# Patient Record
Sex: FEMALE | Race: OTHER | NOT HISPANIC OR LATINO | Employment: STUDENT | URBAN - METROPOLITAN AREA
[De-identification: names, ages, dates, MRNs, and addresses within clinical notes are randomized per-mention and may not be internally consistent; named-entity substitution may affect disease eponyms.]

---

## 2018-01-24 ENCOUNTER — PROCEDURE VISIT (OUTPATIENT)
Dept: OBSTETRICS AND GYNECOLOGY | Facility: CLINIC | Age: 33
End: 2018-01-24

## 2018-01-24 ENCOUNTER — OFFICE VISIT (OUTPATIENT)
Dept: OBSTETRICS AND GYNECOLOGY | Facility: CLINIC | Age: 33
End: 2018-01-24

## 2018-01-24 VITALS
WEIGHT: 163 LBS | BODY MASS INDEX: 27.16 KG/M2 | HEIGHT: 65 IN | DIASTOLIC BLOOD PRESSURE: 80 MMHG | SYSTOLIC BLOOD PRESSURE: 110 MMHG

## 2018-01-24 DIAGNOSIS — E28.2 POLYCYSTIC DISEASE, OVARIES: Primary | ICD-10-CM

## 2018-01-24 DIAGNOSIS — E28.2 POLYCYSTIC OVARIES: Primary | ICD-10-CM

## 2018-01-24 PROCEDURE — 99213 OFFICE O/P EST LOW 20 MIN: CPT | Performed by: OBSTETRICS & GYNECOLOGY

## 2018-01-24 PROCEDURE — 76830 TRANSVAGINAL US NON-OB: CPT | Performed by: OBSTETRICS & GYNECOLOGY

## 2018-01-24 NOTE — PROGRESS NOTES
Subjective:    Patient Elza Arcos is a 32 y.o. female.   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      Review of Systems      Objective:      Physical Exam   Constitutional: She appears well-developed and well-nourished. She is not intubated.   HENT:   Head: Hair is normal.   Nose: Nose normal.   Mouth/Throat: Oropharynx is clear and moist.   Eyes: Conjunctivae are normal.   Neck: Normal carotid pulses and no JVD present. No tracheal tenderness, no spinous process tenderness and no muscular tenderness present. Carotid bruit is not present. No rigidity. No edema, no erythema and normal range of motion present. No thyroid mass and no thyromegaly present.   Cardiovascular: Normal rate, regular rhythm, S1 normal and normal heart sounds.  Exam reveals no gallop.    No murmur heard.  Pulmonary/Chest: Effort normal. No accessory muscle usage or stridor. No apnea, no tachypnea and no bradypnea. She is not intubated. No respiratory distress. She has no wheezes. She has no rales. She exhibits no tenderness. Right breast exhibits no inverted nipple, no mass, no nipple discharge, no skin change and no tenderness. Left breast exhibits no inverted nipple, no mass, no nipple discharge, no skin change and no tenderness.   Abdominal: Soft. Bowel sounds are normal. She exhibits no distension and no mass. There is no tenderness. There is no rebound and no guarding. No hernia.   Genitourinary: Vagina normal and uterus normal. Rectal exam shows no external  hemorrhoid, no internal hemorrhoid, no fissure, no mass, no tenderness and anal tone normal. There is no rash, tenderness, lesion or injury on the right labia. There is no rash, tenderness, lesion or injury on the left labia. Uterus is not deviated, not enlarged, not fixed and not tender. Cervix exhibits no motion tenderness, no discharge and no friability. Right adnexum displays no mass and no tenderness. Left adnexum displays no mass and no tenderness. No erythema, tenderness or bleeding in the vagina. No foreign body in the vagina. No signs of injury around the vagina. No vaginal discharge found.   Musculoskeletal: She exhibits no edema or tenderness.        Right shoulder: She exhibits no tenderness, no swelling, no pain and no spasm.   Lymphadenopathy:        Head (right side): No submental, no submandibular, no tonsillar, no preauricular, no posterior auricular and no occipital adenopathy present.        Head (left side): No submental, no submandibular, no tonsillar, no preauricular, no posterior auricular and no occipital adenopathy present.     She has no cervical adenopathy.        Right cervical: No superficial cervical, no deep cervical and no posterior cervical adenopathy present.       Left cervical: No superficial cervical, no deep cervical and no posterior cervical adenopathy present.        Right axillary: No pectoral and no lateral adenopathy present.        Left axillary: No pectoral and no lateral adenopathy present.       Right: No inguinal, no supraclavicular and no epitrochlear adenopathy present.        Left: No inguinal, no supraclavicular and no epitrochlear adenopathy present.   Neurological: No cranial nerve deficit. Coordination normal.   Skin: Skin is warm and dry. No abrasion, no bruising, no burn, no lesion, no petechiae, no purpura and no rash noted. Rash is not macular, not maculopapular, not nodular and not urticarial. No cyanosis or erythema. No pallor. Nails show no clubbing.    Psychiatric: She has a normal mood and affect. Her behavior is normal.         Assessment and Plan: Patient is has a history of polycystic ovaries options are discussed in detail with one option being taking the birth control pills for 3 months and see if that helps his far as the cycles clomiphene is discussed risks are discussed as far as twinning especially referral to infertility discussed    Patient has been instructed to perform a self breast exam on a weekly basis, a yearly mammogram, pap smear yearly unless instructed otherwise and bone density every 2 years.  I recommended that the patient not smoke, and discussed smoking cessation when appropriate.     There are no diagnoses linked to this encounter.

## 2018-03-07 ENCOUNTER — TELEPHONE (OUTPATIENT)
Dept: OBSTETRICS AND GYNECOLOGY | Facility: CLINIC | Age: 33
End: 2018-03-07

## 2018-03-14 ENCOUNTER — OFFICE VISIT (OUTPATIENT)
Dept: OBSTETRICS AND GYNECOLOGY | Facility: CLINIC | Age: 33
End: 2018-03-14

## 2018-03-14 VITALS
DIASTOLIC BLOOD PRESSURE: 80 MMHG | SYSTOLIC BLOOD PRESSURE: 110 MMHG | WEIGHT: 163 LBS | BODY MASS INDEX: 27.16 KG/M2 | HEIGHT: 65 IN

## 2018-03-14 DIAGNOSIS — N83.202 CYST OF LEFT OVARY: Primary | ICD-10-CM

## 2018-03-14 DIAGNOSIS — Z78.9 USES BIRTH CONTROL: ICD-10-CM

## 2018-03-14 LAB
B-HCG UR QL: NEGATIVE
INTERNAL NEGATIVE CONTROL: NEGATIVE
INTERNAL POSITIVE CONTROL: POSITIVE
Lab: NORMAL

## 2018-03-14 PROCEDURE — 99213 OFFICE O/P EST LOW 20 MIN: CPT | Performed by: OBSTETRICS & GYNECOLOGY

## 2018-03-14 PROCEDURE — 81025 URINE PREGNANCY TEST: CPT | Performed by: OBSTETRICS & GYNECOLOGY

## 2018-03-14 RX ORDER — ETHYNODIOL DIACETATE AND ETHINYL ESTRADIOL 1 MG-35MCG
1 KIT ORAL DAILY
COMMUNITY
End: 2018-12-17

## 2018-03-14 NOTE — PROGRESS NOTES
Subjective:    Patient Elza Arcos is a 32 y.o. female.   Chief Complaint   Patient presents with   • Medication Problem     Taking Kelnor pt states has been having pain in breast, cramping, nausea, vomiting     CCPatient presents with the significant breast pain cramping nausea vomiting a shouldn't is taking the birth control pill feels like she did ovulate maybe this month so feel like the patient is producing a large amount of hormones from the ovary and also taking the pill    HPI      The following portions of the patient's history were reviewed and updated as appropriate: allergies, current medications, past family history, past medical history, past social history, past surgical history and problem list.      Review of Systems   Constitutional: Negative.    HENT: Negative.    Eyes: Negative.    Respiratory: Negative.    Cardiovascular: Negative.    Gastrointestinal: Negative for abdominal distention, abdominal pain, anal bleeding, blood in stool, constipation, diarrhea, nausea, rectal pain and vomiting.   Endocrine: Negative for cold intolerance, heat intolerance, polydipsia, polyphagia and polyuria.   Genitourinary: Negative.  Negative for decreased urine volume, dyspareunia, dysuria, enuresis, flank pain, frequency, genital sores, hematuria, menstrual problem, pelvic pain, urgency, vaginal bleeding, vaginal discharge and vaginal pain.   Musculoskeletal: Negative.    Skin: Negative.    Allergic/Immunologic: Negative.    Neurological: Negative.    Hematological: Negative for adenopathy. Does not bruise/bleed easily.   Psychiatric/Behavioral: Negative for agitation, confusion and sleep disturbance. The patient is not nervous/anxious.          Objective:      Physical Exam   Constitutional: She appears well-developed and well-nourished. She is not intubated.   HENT:   Head: Hair is normal.   Nose: Nose normal.   Mouth/Throat: Oropharynx is clear and moist.   Eyes: Conjunctivae are normal.   Neck: Normal  carotid pulses and no JVD present. No tracheal tenderness, no spinous process tenderness and no muscular tenderness present. Carotid bruit is not present. No no neck rigidity. No edema, no erythema and normal range of motion present. No thyroid mass and no thyromegaly present.   Cardiovascular: Normal rate, regular rhythm, S1 normal and normal heart sounds.  Exam reveals no gallop.    No murmur heard.  Pulmonary/Chest: Effort normal. No accessory muscle usage or stridor. No apnea, no tachypnea and no bradypnea. She is not intubated. No respiratory distress. She has no wheezes. She has no rales. She exhibits no tenderness. Right breast exhibits no inverted nipple, no mass, no nipple discharge, no skin change and no tenderness. Left breast exhibits no inverted nipple, no mass, no nipple discharge, no skin change and no tenderness.   Abdominal: Soft. Bowel sounds are normal. She exhibits no distension and no mass. There is no tenderness. There is no rebound and no guarding. No hernia.   Genitourinary: Vagina normal and uterus normal. Rectal exam shows no external hemorrhoid, no internal hemorrhoid, no fissure, no mass, no tenderness and anal tone normal. There is no rash, tenderness, lesion or injury on the right labia. There is no rash, tenderness, lesion or injury on the left labia. Uterus is not deviated, not enlarged, not fixed and not tender. Cervix exhibits no motion tenderness, no discharge and no friability. Right adnexum displays no mass and no tenderness. Left adnexum displays no mass and no tenderness. No erythema, tenderness or bleeding in the vagina. No foreign body in the vagina. No signs of injury around the vagina. No vaginal discharge found.   Musculoskeletal: She exhibits no edema or tenderness.        Right shoulder: She exhibits no tenderness, no swelling, no pain and no spasm.   Lymphadenopathy:        Head (right side): No submental, no submandibular, no tonsillar, no preauricular, no posterior  auricular and no occipital adenopathy present.        Head (left side): No submental, no submandibular, no tonsillar, no preauricular, no posterior auricular and no occipital adenopathy present.     She has no cervical adenopathy.        Right cervical: No superficial cervical, no deep cervical and no posterior cervical adenopathy present.       Left cervical: No superficial cervical, no deep cervical and no posterior cervical adenopathy present.        Right axillary: No pectoral and no lateral adenopathy present.        Left axillary: No pectoral and no lateral adenopathy present.       Right: No inguinal, no supraclavicular and no epitrochlear adenopathy present.        Left: No inguinal, no supraclavicular and no epitrochlear adenopathy present.   Neurological: No cranial nerve deficit. Coordination normal.   Skin: Skin is warm and dry. No abrasion, no bruising, no burn, no lesion, no petechiae, no purpura and no rash noted. Rash is not macular, not maculopapular, not nodular and not urticarial. No cyanosis or erythema. No pallor. Nails show no clubbing.   Psychiatric: She has a normal mood and affect. Her behavior is normal.         Assessment and Plan: Patient's having significant breast tenderness with breast enlargement obviously from the hormone production patient felt like she ovulated this month and feel like the birth control pills plus ovulation is the reason for the increase hormone production urine pregnancy test was negative so we will have the ultrasound scheduled next Wednesday patient is to stop the birth control pills after the next 2 pills stay off the pills 3 days and restart with suppression patient is to have ultrasound next Wednesday so we will recheck her at that time    Patient has been instructed to perform a self breast exam on a weekly basis, a yearly mammogram, pap smear yearly unless instructed otherwise and bone density every 2 years.  I recommended that the patient not smoke, and  discussed smoking cessation when appropriate.     There are no diagnoses linked to this encounter.

## 2018-04-18 ENCOUNTER — OFFICE VISIT (OUTPATIENT)
Dept: OBSTETRICS AND GYNECOLOGY | Facility: CLINIC | Age: 33
End: 2018-04-18

## 2018-04-18 ENCOUNTER — PROCEDURE VISIT (OUTPATIENT)
Dept: OBSTETRICS AND GYNECOLOGY | Facility: CLINIC | Age: 33
End: 2018-04-18

## 2018-04-18 VITALS
BODY MASS INDEX: 27.16 KG/M2 | WEIGHT: 163 LBS | DIASTOLIC BLOOD PRESSURE: 80 MMHG | SYSTOLIC BLOOD PRESSURE: 110 MMHG | HEIGHT: 65 IN

## 2018-04-18 DIAGNOSIS — N93.9 ABNORMAL UTERINE BLEEDING: Primary | ICD-10-CM

## 2018-04-18 DIAGNOSIS — E28.2 POLYCYSTIC OVARIES: Primary | ICD-10-CM

## 2018-04-18 PROCEDURE — 76830 TRANSVAGINAL US NON-OB: CPT | Performed by: OBSTETRICS & GYNECOLOGY

## 2018-04-18 PROCEDURE — 99213 OFFICE O/P EST LOW 20 MIN: CPT | Performed by: OBSTETRICS & GYNECOLOGY

## 2018-04-18 NOTE — PROGRESS NOTES
Subjective:    Patient Elza Arcos is a 32 y.o. female.   Chief Complaint   Patient presents with   • Follow-up     U/S   This patient has polycystic ovaries presents today after being on the pill for 3 months the ovaries number of systems decreased and the patient is desirous of trying to get pregnant so the plan will be to stop the pills and see if the patient ovulates no ovulation in several months will try the patient on clomiphene for 3 months      HPI  patient desires pregnancy does have multicystic ovaries which patient was placed on suppression for 3 months and the number of cystic activity has decreased so will stop the pill and see if patient ovulates       The following portions of the patient's history were reviewed and updated as appropriate: allergies, current medications, past family history, past medical history, past social history, past surgical history and problem list.      Review of Systems   Constitutional: Negative.    HENT: Negative.    Eyes: Negative.    Respiratory: Negative.    Cardiovascular: Negative.    Gastrointestinal: Negative for abdominal distention, abdominal pain, anal bleeding, blood in stool, constipation, diarrhea, nausea, rectal pain and vomiting.   Endocrine: Negative for cold intolerance, heat intolerance, polydipsia, polyphagia and polyuria.   Genitourinary: Negative.  Negative for decreased urine volume, dyspareunia, dysuria, enuresis, flank pain, frequency, genital sores, hematuria, menstrual problem, pelvic pain, urgency, vaginal bleeding, vaginal discharge and vaginal pain.   Musculoskeletal: Negative.    Skin: Negative.    Allergic/Immunologic: Negative.    Neurological: Negative.    Hematological: Negative for adenopathy. Does not bruise/bleed easily.   Psychiatric/Behavioral: Negative for agitation, confusion and sleep disturbance. The patient is not nervous/anxious.          Objective:      Physical Exam   Constitutional: She appears well-developed and  well-nourished. She is not intubated.   HENT:   Head: Hair is normal.   Nose: Nose normal.   Mouth/Throat: Oropharynx is clear and moist.   Eyes: Conjunctivae are normal.   Neck: Normal carotid pulses and no JVD present. No tracheal tenderness, no spinous process tenderness and no muscular tenderness present. Carotid bruit is not present. No no neck rigidity. No edema, no erythema and normal range of motion present. No thyroid mass and no thyromegaly present.   Cardiovascular: Normal rate, regular rhythm, S1 normal and normal heart sounds.  Exam reveals no gallop.    No murmur heard.  Pulmonary/Chest: Effort normal. No accessory muscle usage or stridor. No apnea, no tachypnea and no bradypnea. She is not intubated. No respiratory distress. She has no wheezes. She has no rales. She exhibits no tenderness. Right breast exhibits no inverted nipple, no mass, no nipple discharge, no skin change and no tenderness. Left breast exhibits no inverted nipple, no mass, no nipple discharge, no skin change and no tenderness.   Abdominal: Soft. Bowel sounds are normal. She exhibits no distension and no mass. There is no tenderness. There is no rebound and no guarding. No hernia.   Genitourinary: Vagina normal and uterus normal. Rectal exam shows no external hemorrhoid, no internal hemorrhoid, no fissure, no mass, no tenderness and anal tone normal. There is no rash, tenderness, lesion or injury on the right labia. There is no rash, tenderness, lesion or injury on the left labia. Uterus is not deviated, not enlarged, not fixed and not tender. Cervix exhibits no motion tenderness, no discharge and no friability. Right adnexum displays no mass and no tenderness. Left adnexum displays no mass and no tenderness. No erythema, tenderness or bleeding in the vagina. No foreign body in the vagina. No signs of injury around the vagina. No vaginal discharge found.   Musculoskeletal: She exhibits no edema or tenderness.        Right shoulder:  She exhibits no tenderness, no swelling, no pain and no spasm.   Lymphadenopathy:        Head (right side): No submental, no submandibular, no tonsillar, no preauricular, no posterior auricular and no occipital adenopathy present.        Head (left side): No submental, no submandibular, no tonsillar, no preauricular, no posterior auricular and no occipital adenopathy present.     She has no cervical adenopathy.        Right cervical: No superficial cervical, no deep cervical and no posterior cervical adenopathy present.       Left cervical: No superficial cervical, no deep cervical and no posterior cervical adenopathy present.        Right axillary: No pectoral and no lateral adenopathy present.        Left axillary: No pectoral and no lateral adenopathy present.       Right: No inguinal, no supraclavicular and no epitrochlear adenopathy present.        Left: No inguinal, no supraclavicular and no epitrochlear adenopathy present.   Neurological: No cranial nerve deficit. Coordination normal.   Skin: Skin is warm and dry. No abrasion, no bruising, no burn, no lesion, no petechiae, no purpura and no rash noted. Rash is not macular, not maculopapular, not nodular and not urticarial. No cyanosis or erythema. No pallor. Nails show no clubbing.   Psychiatric: She has a normal mood and affect. Her behavior is normal.         Assessment and Plan: Ultrasound reviewed in detail with the patient and the cystic activity has decreased and the ovaries so will stop the pills and see if the patient ovulates if not in several months will try the patient on clomiphene for several cycles    Patient has been instructed to perform a self breast exam on a weekly basis, a yearly mammogram, pap smear yearly unless instructed otherwise and bone density every 2 years.  I recommended that the patient not smoke, and discussed smoking cessation when appropriate.     Elza was seen today for follow-up.    Diagnoses and all orders for this  visit:    Polycystic ovaries

## 2018-06-07 ENCOUNTER — TELEPHONE (OUTPATIENT)
Dept: OBSTETRICS AND GYNECOLOGY | Facility: CLINIC | Age: 33
End: 2018-06-07

## 2018-06-07 NOTE — TELEPHONE ENCOUNTER
Escribed Clomid #6 1qd starting on third day of cycle. x3 refills. Per Dr. Gonzales. Wants pt to see him after she gets done with Rx. Informed pt. Pt still had some questions so she is keeping upcoming appt then will make another one after that to see him. SM  ----- Message from June Vidales sent at 6/6/2018  1:04 PM EDT -----  PT CALLED REGARDING A SCRIPT FOR CLOMID.  NO SCRIPT FOR CLOMID OR PHARMACY LISTED IN CHART.  PT'S # [phone]

## 2018-06-11 ENCOUNTER — OFFICE VISIT (OUTPATIENT)
Dept: OBSTETRICS AND GYNECOLOGY | Facility: CLINIC | Age: 33
End: 2018-06-11

## 2018-06-11 VITALS
BODY MASS INDEX: 27.16 KG/M2 | SYSTOLIC BLOOD PRESSURE: 110 MMHG | WEIGHT: 163 LBS | DIASTOLIC BLOOD PRESSURE: 80 MMHG | HEIGHT: 65 IN

## 2018-06-11 DIAGNOSIS — N97.0 ANOVULATION: ICD-10-CM

## 2018-06-11 DIAGNOSIS — Z87.42 HISTORY OF IRREGULAR MENSTRUAL CYCLES: Primary | ICD-10-CM

## 2018-06-11 PROCEDURE — 99213 OFFICE O/P EST LOW 20 MIN: CPT | Performed by: OBSTETRICS & GYNECOLOGY

## 2018-06-12 ENCOUNTER — TELEPHONE (OUTPATIENT)
Dept: OBSTETRICS AND GYNECOLOGY | Facility: CLINIC | Age: 33
End: 2018-06-12

## 2018-06-12 NOTE — PROGRESS NOTES
Subjective:    Patient Elza Arcos is a 33 y.o. female.   Chief Complaint   Patient presents with   • Follow-up     F/U MEDS     CCPatient has a history of irregular cycles desiring pregnancy will start Clomid 50 mg day 3-8 ovulatory time and explained to she and her     HPI patient is normally healthy having no major issues except irregular cycles will start Clomid      The following portions of the patient's history were reviewed and updated as appropriate: allergies, current medications, past family history, past medical history, past social history, past surgical history and problem list.      Review of Systems   Constitutional: Negative.    HENT: Negative.    Eyes: Negative.    Respiratory: Negative.    Cardiovascular: Negative.    Gastrointestinal: Negative for abdominal distention, abdominal pain, anal bleeding, blood in stool, constipation, diarrhea, nausea, rectal pain and vomiting.   Endocrine: Negative for cold intolerance, heat intolerance, polydipsia, polyphagia and polyuria.   Genitourinary: Negative.  Negative for decreased urine volume, dyspareunia, dysuria, enuresis, flank pain, frequency, genital sores, hematuria, menstrual problem, pelvic pain, urgency, vaginal bleeding, vaginal discharge and vaginal pain.   Musculoskeletal: Negative.    Skin: Negative.    Allergic/Immunologic: Negative.    Neurological: Negative.    Hematological: Negative for adenopathy. Does not bruise/bleed easily.   Psychiatric/Behavioral: Negative for agitation, confusion and sleep disturbance. The patient is not nervous/anxious.          Objective:      Physical Exam   Constitutional: She appears well-developed and well-nourished. She is not intubated.   HENT:   Head: Hair is normal.   Nose: Nose normal.   Mouth/Throat: Oropharynx is clear and moist.   Eyes: Conjunctivae are normal.   Neck: Normal carotid pulses and no JVD present. No tracheal tenderness, no spinous process tenderness and no muscular tenderness  present. Carotid bruit is not present. No neck rigidity. No edema, no erythema and normal range of motion present. No thyroid mass and no thyromegaly present.   Cardiovascular: Normal rate, regular rhythm, S1 normal and normal heart sounds.  Exam reveals no gallop.    No murmur heard.  Pulmonary/Chest: Effort normal. No accessory muscle usage or stridor. No apnea, no tachypnea and no bradypnea. She is not intubated. No respiratory distress. She has no wheezes. She has no rales. She exhibits no tenderness. Right breast exhibits no inverted nipple, no mass, no nipple discharge, no skin change and no tenderness. Left breast exhibits no inverted nipple, no mass, no nipple discharge, no skin change and no tenderness.   Abdominal: Soft. Bowel sounds are normal. She exhibits no distension and no mass. There is no tenderness. There is no rebound and no guarding. No hernia.   Genitourinary: Vagina normal and uterus normal. Rectal exam shows no external hemorrhoid, no internal hemorrhoid, no fissure, no mass, no tenderness and anal tone normal. There is no rash, tenderness, lesion or injury on the right labia. There is no rash, tenderness, lesion or injury on the left labia. Uterus is not deviated, not enlarged, not fixed and not tender. Cervix exhibits no motion tenderness, no discharge and no friability. Right adnexum displays no mass and no tenderness. Left adnexum displays no mass and no tenderness. No erythema, tenderness or bleeding in the vagina. No foreign body in the vagina. No signs of injury around the vagina. No vaginal discharge found.   Musculoskeletal: She exhibits no edema or tenderness.        Right shoulder: She exhibits no tenderness, no swelling, no pain and no spasm.   Lymphadenopathy:        Head (right side): No submental, no submandibular, no tonsillar, no preauricular, no posterior auricular and no occipital adenopathy present.        Head (left side): No submental, no submandibular, no tonsillar, no  preauricular, no posterior auricular and no occipital adenopathy present.     She has no cervical adenopathy.        Right cervical: No superficial cervical, no deep cervical and no posterior cervical adenopathy present.       Left cervical: No superficial cervical, no deep cervical and no posterior cervical adenopathy present.        Right axillary: No pectoral and no lateral adenopathy present.        Left axillary: No pectoral and no lateral adenopathy present.       Right: No inguinal, no supraclavicular and no epitrochlear adenopathy present.        Left: No inguinal, no supraclavicular and no epitrochlear adenopathy present.   Neurological: No cranial nerve deficit. Coordination normal.   Skin: Skin is warm and dry. No abrasion, no bruising, no burn, no lesion, no petechiae, no purpura and no rash noted. Rash is not macular, not maculopapular, not nodular and not urticarial. No cyanosis or erythema. No pallor. Nails show no clubbing.   Psychiatric: She has a normal mood and affect. Her behavior is normal.         Assessment and Plan: Patient desires pregnancy the prescription for Clomid is discussed and given to the patient with the  present the patient did take Clomid with this cycle and she is now on day 12 and fertility. Period  This discussed    Patient has been instructed to perform a self breast exam on a weekly basis, a yearly mammogram, pap smear yearly unless instructed otherwise and bone density every 2 years.  I recommended that the patient not smoke, and discussed smoking cessation when appropriate.     Elza was seen today for follow-up.    Diagnoses and all orders for this visit:    History of irregular menstrual cycles    Anovulation

## 2018-06-12 NOTE — TELEPHONE ENCOUNTER
Called pharm to get info on doing PA for Clomid. Was informed that pt paid out of pocket. Pharmacist stated that they would contact us again if pt decided she would like a PA done. MIRIAM

## 2018-07-23 ENCOUNTER — PROCEDURE VISIT (OUTPATIENT)
Dept: OBSTETRICS AND GYNECOLOGY | Facility: CLINIC | Age: 33
End: 2018-07-23

## 2018-07-23 ENCOUNTER — OFFICE VISIT (OUTPATIENT)
Dept: OBSTETRICS AND GYNECOLOGY | Facility: CLINIC | Age: 33
End: 2018-07-23

## 2018-07-23 VITALS
DIASTOLIC BLOOD PRESSURE: 80 MMHG | SYSTOLIC BLOOD PRESSURE: 110 MMHG | BODY MASS INDEX: 27.16 KG/M2 | WEIGHT: 163 LBS | HEIGHT: 65 IN

## 2018-07-23 DIAGNOSIS — N94.0 OVULATION PAIN: Primary | ICD-10-CM

## 2018-07-23 DIAGNOSIS — E28.2 POLYCYSTIC OVARIES: Primary | ICD-10-CM

## 2018-07-23 PROCEDURE — 99213 OFFICE O/P EST LOW 20 MIN: CPT | Performed by: OBSTETRICS & GYNECOLOGY

## 2018-07-23 PROCEDURE — 76830 TRANSVAGINAL US NON-OB: CPT | Performed by: OBSTETRICS & GYNECOLOGY

## 2018-07-23 NOTE — PROGRESS NOTES
Subjective:    Patient Elza Arcos is a 33 y.o. female.   Chief Complaint   Patient presents with   • Follow-up     Meds pt states some clear discharge     The clomiphene does seem to be working patient does have a significant issue in that the  is a long- and is gone generally 3 weeks out of the month and will time the Clomid and try to arrange as such that the 's in town during the referral.  Continue her on the clomiphene for another 3 months but I think it be worth her seeing Dr. Malin for and    HPI      The following portions of the patient's history were reviewed and updated as appropriate: allergies, current medications, past family history, past medical history, past social history, past surgical history and problem list.      Review of Systems   Constitutional: Negative.    HENT: Negative.    Eyes: Negative.    Respiratory: Negative.    Cardiovascular: Negative.    Gastrointestinal: Negative for abdominal distention, abdominal pain, anal bleeding, blood in stool, constipation, diarrhea, nausea, rectal pain and vomiting.   Endocrine: Negative for cold intolerance, heat intolerance, polydipsia, polyphagia and polyuria.   Genitourinary: Negative.  Negative for decreased urine volume, dyspareunia, dysuria, enuresis, flank pain, frequency, genital sores, hematuria, menstrual problem, pelvic pain, urgency, vaginal bleeding, vaginal discharge and vaginal pain.   Musculoskeletal: Negative.    Skin: Negative.    Allergic/Immunologic: Negative.    Neurological: Negative.    Hematological: Negative for adenopathy. Does not bruise/bleed easily.   Psychiatric/Behavioral: Negative for agitation, confusion and sleep disturbance. The patient is not nervous/anxious.          Objective:      Physical Exam exam today does not reveal any ovarian tenderness patient does feel like she ovulated she is having some minor discomfort on the right ovary which would account for ovulation on the right  side      Assessment and Plan: Discussions carried out about the  being away the 3 weeks a month and will plan to try and regulate the ovulation for the time he is in detail    Patient has been instructed to perform a self breast exam on a weekly basis, a yearly mammogram, pap smear yearly unless instructed otherwise and bone density every 2 years.  I recommended that the patient not smoke, and discussed smoking cessation when appropriate.     Elza was seen today for follow-up.    Diagnoses and all orders for this visit:    Ovulation pain  -     Ambulatory Referral to Infertility

## 2018-12-04 LAB — HCG INTACT+B SERPL-ACNC: 233.1 MIU/ML

## 2018-12-05 ENCOUNTER — TELEPHONE (OUTPATIENT)
Dept: OBSTETRICS AND GYNECOLOGY | Facility: CLINIC | Age: 33
End: 2018-12-05

## 2018-12-05 ENCOUNTER — RESULTS ENCOUNTER (OUTPATIENT)
Dept: OBSTETRICS AND GYNECOLOGY | Facility: CLINIC | Age: 33
End: 2018-12-05

## 2018-12-05 DIAGNOSIS — N92.6 IRREGULAR MENSES: ICD-10-CM

## 2018-12-05 DIAGNOSIS — Z32.00 POSSIBLE PREGNANCY: ICD-10-CM

## 2018-12-05 NOTE — TELEPHONE ENCOUNTER
Dr. Gonzales [facility] informing pt to repeat HCG next week.  ----- Message from June Vidales sent at 12/4/2018  4:18 PM EST -----  PT called requesting results of recent labs

## 2018-12-11 ENCOUNTER — TELEPHONE (OUTPATIENT)
Dept: OBSTETRICS AND GYNECOLOGY | Facility: CLINIC | Age: 33
End: 2018-12-11

## 2018-12-12 ENCOUNTER — TELEPHONE (OUTPATIENT)
Dept: OBSTETRICS AND GYNECOLOGY | Facility: CLINIC | Age: 33
End: 2018-12-12

## 2018-12-12 NOTE — TELEPHONE ENCOUNTER
Spoke with pt informed pt that Dr. Gonzales wanted to do u/s to see how far along and he could answer more of her questions on Friday. But this was normal and pt should not to be concerned about getting the u/s friday.   ----- Message from June Vidales sent at 12/12/2018  4:13 PM EST -----  Pt called, has several questions regarding why she has to come in and have an u/s done.  Pt claims she is scared, and wants to know what is wrong.  Attempted to calm pt and inform her the u/s was to find out if anything was going on.  She wants to speak with you or Dr. Gonzales.

## 2018-12-14 ENCOUNTER — OFFICE VISIT (OUTPATIENT)
Dept: OBSTETRICS AND GYNECOLOGY | Facility: CLINIC | Age: 33
End: 2018-12-14

## 2018-12-14 ENCOUNTER — PROCEDURE VISIT (OUTPATIENT)
Dept: OBSTETRICS AND GYNECOLOGY | Facility: CLINIC | Age: 33
End: 2018-12-14

## 2018-12-14 ENCOUNTER — TELEPHONE (OUTPATIENT)
Dept: OBSTETRICS AND GYNECOLOGY | Facility: CLINIC | Age: 33
End: 2018-12-14

## 2018-12-14 VITALS
BODY MASS INDEX: 27.16 KG/M2 | WEIGHT: 163 LBS | HEIGHT: 65 IN | SYSTOLIC BLOOD PRESSURE: 112 MMHG | DIASTOLIC BLOOD PRESSURE: 80 MMHG

## 2018-12-14 DIAGNOSIS — O36.80X0 ENCOUNTER TO DETERMINE FETAL VIABILITY OF PREGNANCY, SINGLE OR UNSPECIFIED FETUS: Primary | ICD-10-CM

## 2018-12-14 DIAGNOSIS — Z34.90 PREGNANCY, UNSPECIFIED GESTATIONAL AGE: Primary | ICD-10-CM

## 2018-12-14 PROCEDURE — 76817 TRANSVAGINAL US OBSTETRIC: CPT | Performed by: OBSTETRICS & GYNECOLOGY

## 2018-12-14 PROCEDURE — 99213 OFFICE O/P EST LOW 20 MIN: CPT | Performed by: OBSTETRICS & GYNECOLOGY

## 2018-12-14 NOTE — TELEPHONE ENCOUNTER
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 Asked if she was ok to keep doing that job. Per Dr. Gonzales said it was best not to be around radiation. Pt stated she needed a note for work. Told her I would send it when as soon as possible to the number given [phone]  ----- Message from June Vidales sent at 12/14/2018 11:00 AM EST -----  Pt called, has some questions for you.  Please call her.

## 2018-12-17 NOTE — PROGRESS NOTES
Subjective:    Patient Elza Arcos is a 33 y.o. female.   Chief Complaint   Patient presents with   • Follow-up     U/S     Patient planning pregnancy she has a positive test the hCG levels have gone up ultrasound did not reveal a fetal pole yet so the patient probably is less than 5 weeks right now and it is she is told to make an appointment with the one of my obstetric docs  in 2 weeks and repeat the ultrasound    HPI      The following portions of the patient's history were reviewed and updated as appropriate: allergies, current medications, past family history, past medical history, past social history, past surgical history and problem list.      Review of Systems   Constitutional: Negative.    HENT: Negative.    Eyes: Negative.    Respiratory: Negative.    Cardiovascular: Negative.    Gastrointestinal: Negative for abdominal distention, abdominal pain, anal bleeding, blood in stool, constipation, diarrhea, nausea, rectal pain and vomiting.   Endocrine: Negative for cold intolerance, heat intolerance, polydipsia, polyphagia and polyuria.   Genitourinary: Negative.  Negative for decreased urine volume, dyspareunia, dysuria, enuresis, flank pain, frequency, genital sores, hematuria, menstrual problem, pelvic pain, urgency, vaginal bleeding, vaginal discharge and vaginal pain.   Musculoskeletal: Negative.    Skin: Negative.    Allergic/Immunologic: Negative.    Neurological: Negative.    Hematological: Negative for adenopathy. Does not bruise/bleed easily.   Psychiatric/Behavioral: Negative for agitation, confusion and sleep disturbance. The patient is not nervous/anxious.          Objective:      Physical Exam   Genitourinary:   Genitourinary Comments: Slightly enlarged nontender no adnexal masses no tenderness no bleeding ultrasound 5 weeks of make an appointment with OB and repeat the ultrasound         Assessment and Plan:    Patient has been instructed to perform a self breast exam on a weekly basis, a  yearly mammogram, pap smear yearly unless instructed otherwise and bone density every 2 years.  I recommended that the patient not smoke, and discussed smoking cessation when appropriate.     There are no diagnoses linked to this encounter.

## 2018-12-26 ENCOUNTER — TELEPHONE (OUTPATIENT)
Dept: OBSTETRICS AND GYNECOLOGY | Facility: CLINIC | Age: 33
End: 2018-12-26

## 2018-12-26 NOTE — TELEPHONE ENCOUNTER
Spoke with pt, pt stated that per her boss that on the letter in needed to be specific on where she can not be around the radiation. Stated to pt we can put that she can not be around radiation while in the room or when taking films. Pt stated that was ok. Faxed new letter. SM

## 2019-01-08 ENCOUNTER — APPOINTMENT (OUTPATIENT)
Dept: ULTRASOUND IMAGING | Facility: HOSPITAL | Age: 34
End: 2019-01-08

## 2019-01-08 ENCOUNTER — HOSPITAL ENCOUNTER (EMERGENCY)
Facility: HOSPITAL | Age: 34
Discharge: HOME | End: 2019-01-08
Attending: EMERGENCY MEDICINE | Admitting: EMERGENCY MEDICINE

## 2019-01-08 VITALS
OXYGEN SATURATION: 100 % | BODY MASS INDEX: 27.16 KG/M2 | RESPIRATION RATE: 18 BRPM | HEART RATE: 77 BPM | TEMPERATURE: 97.9 F | SYSTOLIC BLOOD PRESSURE: 127 MMHG | DIASTOLIC BLOOD PRESSURE: 75 MMHG | WEIGHT: 163 LBS | HEIGHT: 65 IN

## 2019-01-08 DIAGNOSIS — R00.2 PALPITATIONS: ICD-10-CM

## 2019-01-08 DIAGNOSIS — R10.32 LEFT LOWER QUADRANT PAIN: ICD-10-CM

## 2019-01-08 DIAGNOSIS — O36.80X0 PREGNANCY OF UNKNOWN ANATOMIC LOCATION: Primary | ICD-10-CM

## 2019-01-08 LAB
ALBUMIN SERPL-MCNC: 4 G/DL (ref 3.5–5.2)
ALBUMIN/GLOB SERPL: 1 G/DL
ALP SERPL-CCNC: 61 U/L (ref 39–117)
ALT SERPL W P-5'-P-CCNC: 19 U/L (ref 1–33)
ANION GAP SERPL CALCULATED.3IONS-SCNC: 7.8 MMOL/L
AST SERPL-CCNC: 19 U/L (ref 1–32)
BASOPHILS # BLD AUTO: 0.02 10*3/MM3 (ref 0–0.2)
BASOPHILS NFR BLD AUTO: 0.4 % (ref 0–1.5)
BILIRUB SERPL-MCNC: 0.2 MG/DL (ref 0.1–1.2)
BILIRUB UR QL STRIP: NEGATIVE
BUN BLD-MCNC: 8 MG/DL (ref 6–20)
BUN/CREAT SERPL: 12.5 (ref 7–25)
CALCIUM SPEC-SCNC: 9.6 MG/DL (ref 8.6–10.5)
CHLORIDE SERPL-SCNC: 104 MMOL/L (ref 98–107)
CLARITY UR: CLEAR
CO2 SERPL-SCNC: 26.2 MMOL/L (ref 22–29)
COLOR UR: YELLOW
CREAT BLD-MCNC: 0.64 MG/DL (ref 0.57–1)
DEPRECATED RDW RBC AUTO: 46.6 FL (ref 37–54)
EOSINOPHIL # BLD AUTO: 0.11 10*3/MM3 (ref 0–0.7)
EOSINOPHIL NFR BLD AUTO: 2.4 % (ref 0.3–6.2)
ERYTHROCYTE [DISTWIDTH] IN BLOOD BY AUTOMATED COUNT: 15.6 % (ref 11.7–13)
GFR SERPL CREATININE-BSD FRML MDRD: 130 ML/MIN/1.73
GLOBULIN UR ELPH-MCNC: 3.9 GM/DL
GLUCOSE BLD-MCNC: 79 MG/DL (ref 65–99)
GLUCOSE UR STRIP-MCNC: NEGATIVE MG/DL
HCG INTACT+B SERPL-ACNC: NORMAL MIU/ML
HCT VFR BLD AUTO: 34.4 % (ref 35.6–45.5)
HGB BLD-MCNC: 11.1 G/DL (ref 11.9–15.5)
HGB UR QL STRIP.AUTO: NEGATIVE
IMM GRANULOCYTES # BLD AUTO: 0 10*3/MM3 (ref 0–0.03)
IMM GRANULOCYTES NFR BLD AUTO: 0 % (ref 0–0.5)
KETONES UR QL STRIP: NEGATIVE
LEUKOCYTE ESTERASE UR QL STRIP.AUTO: NEGATIVE
LYMPHOCYTES # BLD AUTO: 1.84 10*3/MM3 (ref 0.9–4.8)
LYMPHOCYTES NFR BLD AUTO: 40.6 % (ref 19.6–45.3)
MCH RBC QN AUTO: 26.5 PG (ref 26.9–32)
MCHC RBC AUTO-ENTMCNC: 32.3 G/DL (ref 32.4–36.3)
MCV RBC AUTO: 82.1 FL (ref 80.5–98.2)
MONOCYTES # BLD AUTO: 0.51 10*3/MM3 (ref 0.2–1.2)
MONOCYTES NFR BLD AUTO: 11.3 % (ref 5–12)
NEUTROPHILS # BLD AUTO: 2.05 10*3/MM3 (ref 1.9–8.1)
NEUTROPHILS NFR BLD AUTO: 45.3 % (ref 42.7–76)
NITRITE UR QL STRIP: NEGATIVE
NT-PROBNP SERPL-MCNC: 13.9 PG/ML (ref 5–450)
PH UR STRIP.AUTO: 7 [PH] (ref 5–8)
PLATELET # BLD AUTO: 228 10*3/MM3 (ref 140–500)
PMV BLD AUTO: 9.8 FL (ref 6–12)
POTASSIUM BLD-SCNC: 4 MMOL/L (ref 3.5–5.2)
PROT SERPL-MCNC: 7.9 G/DL (ref 6–8.5)
PROT UR QL STRIP: NEGATIVE
RBC # BLD AUTO: 4.19 10*6/MM3 (ref 3.9–5.2)
SODIUM BLD-SCNC: 138 MMOL/L (ref 136–145)
SP GR UR STRIP: 1.01 (ref 1–1.03)
T4 FREE SERPL-MCNC: 0.97 NG/DL (ref 0.93–1.7)
TROPONIN T SERPL-MCNC: <0.01 NG/ML (ref 0–0.03)
TSH SERPL DL<=0.05 MIU/L-ACNC: 0.66 MIU/ML (ref 0.27–4.2)
UROBILINOGEN UR QL STRIP: NORMAL
WBC NRBC COR # BLD: 4.53 10*3/MM3 (ref 4.5–10.7)

## 2019-01-08 PROCEDURE — 93005 ELECTROCARDIOGRAM TRACING: CPT | Performed by: EMERGENCY MEDICINE

## 2019-01-08 PROCEDURE — 83880 ASSAY OF NATRIURETIC PEPTIDE: CPT | Performed by: EMERGENCY MEDICINE

## 2019-01-08 PROCEDURE — 99284 EMERGENCY DEPT VISIT MOD MDM: CPT

## 2019-01-08 PROCEDURE — 81003 URINALYSIS AUTO W/O SCOPE: CPT | Performed by: EMERGENCY MEDICINE

## 2019-01-08 PROCEDURE — 85025 COMPLETE CBC W/AUTO DIFF WBC: CPT | Performed by: EMERGENCY MEDICINE

## 2019-01-08 PROCEDURE — 87086 URINE CULTURE/COLONY COUNT: CPT | Performed by: EMERGENCY MEDICINE

## 2019-01-08 PROCEDURE — 93010 ELECTROCARDIOGRAM REPORT: CPT | Performed by: INTERNAL MEDICINE

## 2019-01-08 PROCEDURE — 84702 CHORIONIC GONADOTROPIN TEST: CPT | Performed by: EMERGENCY MEDICINE

## 2019-01-08 PROCEDURE — 36415 COLL VENOUS BLD VENIPUNCTURE: CPT

## 2019-01-08 PROCEDURE — 84484 ASSAY OF TROPONIN QUANT: CPT | Performed by: EMERGENCY MEDICINE

## 2019-01-08 PROCEDURE — 76815 OB US LIMITED FETUS(S): CPT

## 2019-01-08 PROCEDURE — 76817 TRANSVAGINAL US OBSTETRIC: CPT

## 2019-01-08 PROCEDURE — 80053 COMPREHEN METABOLIC PANEL: CPT | Performed by: EMERGENCY MEDICINE

## 2019-01-08 PROCEDURE — 84443 ASSAY THYROID STIM HORMONE: CPT | Performed by: EMERGENCY MEDICINE

## 2019-01-08 PROCEDURE — 84439 ASSAY OF FREE THYROXINE: CPT | Performed by: EMERGENCY MEDICINE

## 2019-01-08 PROCEDURE — 93976 VASCULAR STUDY: CPT

## 2019-01-08 RX ORDER — TRIAMCINOLONE ACETONIDE 0.25 MG/G
OINTMENT TOPICAL 2 TIMES DAILY
COMMUNITY
End: 2020-02-03

## 2019-01-08 RX ORDER — ACETAMINOPHEN 500 MG
1000 TABLET ORAL ONCE
Status: COMPLETED | OUTPATIENT
Start: 2019-01-08 | End: 2019-01-08

## 2019-01-08 RX ORDER — SODIUM CHLORIDE 0.9 % (FLUSH) 0.9 %
10 SYRINGE (ML) INJECTION AS NEEDED
Status: DISCONTINUED | OUTPATIENT
Start: 2019-01-08 | End: 2019-01-08 | Stop reason: HOSPADM

## 2019-01-08 RX ADMIN — ACETAMINOPHEN 1000 MG: 500 TABLET, FILM COATED ORAL at 14:17

## 2019-01-08 NOTE — ED PROVIDER NOTES
EMERGENCY DEPARTMENT ENCOUNTER    Room Number:    Date seen:  2019  Time seen: 11:21 AM  PCP: Provider, No Known  Historian: patient      HPI:  Chief Complaint: palpitations    Context: Elza Arcos is a 33 y.o. female who presents to the ED c/o  intermittent rapid palpitations for the last week. Pt states that her palpitations are worse with exertion and resolve with rest. Pt states that she took her BP this morning using a wrist cuff and noticed that she was hypertensive to 230/88. Pt also complains of a HA yesterday (now resolved), fatigue, LLQ pain, BLE edema and bilateral hand edema but denies nausea. Pt states that she is about 10 weeks pregnant.  Ab0    Pain Location: left chest  Radiation: none  Quality: rapid  Intensity/Severity: moderate  Duration: one week  Onset quality: gradual  Timing: intermittent  Progression: unchanged  Aggravating Factors: exertion  Alleviating Factors: none  Previous Episodes: none  Treatment before arrival: none  Associated Symptoms: HA, fatigue, LLQ pain, BLE edema and bilateral hand edema    PAST MEDICAL HISTORY  Active Ambulatory Problems     Diagnosis Date Noted   • No Active Ambulatory Problems     Resolved Ambulatory Problems     Diagnosis Date Noted   • No Resolved Ambulatory Problems     Past Medical History:   Diagnosis Date   • Glaucoma    • Polycystic ovarian syndrome          PAST SURGICAL HISTORY  Past Surgical History:   Procedure Laterality Date   • LIPOSUCTION           FAMILY HISTORY  Family History   Problem Relation Age of Onset   • Hypertension Father    • Hypertension Mother    • Diabetes Mother    • Hyperthyroidism Mother          SOCIAL HISTORY  Social History     Socioeconomic History   • Marital status: Single     Spouse name: Not on file   • Number of children: Not on file   • Years of education: Not on file   • Highest education level: Not on file   Social Needs   • Financial resource strain: Not on file   • Food insecurity - worry: Not  on file   • Food insecurity - inability: Not on file   • Transportation needs - medical: Not on file   • Transportation needs - non-medical: Not on file   Occupational History   • Not on file   Tobacco Use   • Smoking status: Never Smoker   • Smokeless tobacco: Never Used   Substance and Sexual Activity   • Alcohol use: No     Frequency: Never     Comment: not since being preg   • Drug use: No   • Sexual activity: Yes     Partners: Male   Other Topics Concern   • Not on file   Social History Narrative   • Not on file         ALLERGIES  Patient has no known allergies.        REVIEW OF SYSTEMS  Review of Systems   Constitutional: Positive for fatigue. Negative for fever.   HENT: Negative for sore throat.    Respiratory: Negative for shortness of breath.    Cardiovascular: Positive for palpitations and leg swelling (BLE).   Gastrointestinal: Positive for abdominal pain (LLQ).   Endocrine: Negative for polyuria.   Genitourinary: Negative for dysuria.   Musculoskeletal: Negative for neck pain.        (+) bilateral hand edema   Skin: Negative for rash.   Neurological: Positive for headaches (yesterday, now resolved).   All other systems reviewed and are negative.           PHYSICAL EXAM  ED Triage Vitals [01/08/19 1015]   Temp Heart Rate Resp BP SpO2   97.9 °F (36.6 °C) 97 18 116/86 100 %      Temp src Heart Rate Source Patient Position BP Location FiO2 (%)   Tympanic -- -- -- --         GENERAL: not distressed  HENT: nares patent  EYES: no scleral icterus  CV: regular rhythm, regular rate  RESPIRATORY: normal effort, CTAB  ABDOMEN: soft, LLQ tenderness without rebound or guarding, no palpable uterus  MUSCULOSKELETAL: no deformity, no lower extremity edema or tenderness  NEURO: alert, RIVERA, FC  SKIN: warm, dry    Vital signs and nursing notes reviewed.          LAB RESULTS  Recent Results (from the past 24 hour(s))   Urinalysis With Culture If Indicated - Urine, Clean Catch    Collection Time: 01/08/19 12:49 PM   Result  Value Ref Range    Color, UA Yellow Yellow, Straw    Appearance, UA Clear Clear    pH, UA 7.0 5.0 - 8.0    Specific Gravity, UA 1.014 1.005 - 1.030    Glucose, UA Negative Negative    Ketones, UA Negative Negative    Bilirubin, UA Negative Negative    Blood, UA Negative Negative    Protein, UA Negative Negative    Leuk Esterase, UA Negative Negative    Nitrite, UA Negative Negative    Urobilinogen, UA 0.2 E.U./dL 0.2 - 1.0 E.U./dL   TSH    Collection Time: 01/08/19 12:55 PM   Result Value Ref Range    TSH 0.656 0.270 - 4.200 mIU/mL   T4, Free    Collection Time: 01/08/19 12:55 PM   Result Value Ref Range    Free T4 0.97 0.93 - 1.70 ng/dL   Comprehensive Metabolic Panel    Collection Time: 01/08/19 12:55 PM   Result Value Ref Range    Glucose 79 65 - 99 mg/dL    BUN 8 6 - 20 mg/dL    Creatinine 0.64 0.57 - 1.00 mg/dL    Sodium 138 136 - 145 mmol/L    Potassium 4.0 3.5 - 5.2 mmol/L    Chloride 104 98 - 107 mmol/L    CO2 26.2 22.0 - 29.0 mmol/L    Calcium 9.6 8.6 - 10.5 mg/dL    Total Protein 7.9 6.0 - 8.5 g/dL    Albumin 4.00 3.50 - 5.20 g/dL    ALT (SGPT) 19 1 - 33 U/L    AST (SGOT) 19 1 - 32 U/L    Alkaline Phosphatase 61 39 - 117 U/L    Total Bilirubin 0.2 0.1 - 1.2 mg/dL    eGFR  African Amer 130 >60 mL/min/1.73    Globulin 3.9 gm/dL    A/G Ratio 1.0 g/dL    BUN/Creatinine Ratio 12.5 7.0 - 25.0    Anion Gap 7.8 mmol/L   CBC Auto Differential    Collection Time: 01/08/19 12:55 PM   Result Value Ref Range    WBC 4.53 4.50 - 10.70 10*3/mm3    RBC 4.19 3.90 - 5.20 10*6/mm3    Hemoglobin 11.1 (L) 11.9 - 15.5 g/dL    Hematocrit 34.4 (L) 35.6 - 45.5 %    MCV 82.1 80.5 - 98.2 fL    MCH 26.5 (L) 26.9 - 32.0 pg    MCHC 32.3 (L) 32.4 - 36.3 g/dL    RDW 15.6 (H) 11.7 - 13.0 %    RDW-SD 46.6 37.0 - 54.0 fl    MPV 9.8 6.0 - 12.0 fL    Platelets 228 140 - 500 10*3/mm3    Neutrophil % 45.3 42.7 - 76.0 %    Lymphocyte % 40.6 19.6 - 45.3 %    Monocyte % 11.3 5.0 - 12.0 %    Eosinophil % 2.4 0.3 - 6.2 %    Basophil % 0.4 0.0 - 1.5 %     Immature Grans % 0.0 0.0 - 0.5 %    Neutrophils, Absolute 2.05 1.90 - 8.10 10*3/mm3    Lymphocytes, Absolute 1.84 0.90 - 4.80 10*3/mm3    Monocytes, Absolute 0.51 0.20 - 1.20 10*3/mm3    Eosinophils, Absolute 0.11 0.00 - 0.70 10*3/mm3    Basophils, Absolute 0.02 0.00 - 0.20 10*3/mm3    Immature Grans, Absolute 0.00 0.00 - 0.03 10*3/mm3   BNP    Collection Time: 01/08/19 12:55 PM   Result Value Ref Range    proBNP 13.9 5.0 - 450.0 pg/mL   Troponin    Collection Time: 01/08/19 12:55 PM   Result Value Ref Range    Troponin T <0.010 0.000 - 0.030 ng/mL   hCG, Quantitative, Pregnancy    Collection Time: 01/08/19  2:17 PM   Result Value Ref Range    HCG Quantitative 14,540.00 mIU/mL       Ordered the above labs and reviewed the results.        RADIOLOGY  US Ob Limited 1 + Fetuses    (Results Pending)   US Ob Transvaginal    (Results Pending)   US Testicular or Ovarian Vascular Limited    (Results Pending)          Ordered the above noted radiological studies. Reviewed by me in PACS.  Spoke with Dr. Iraheta (radiologist) regarding US results showing gestational sac with no fetal pull.          PROCEDURES  Procedures  EKG:           EKG time: 1240  Rhythm/Rate: NSR, 62  P waves and AL: normal  QRS, axis: normal   ST and T waves: normal     Interpreted Contemporaneously by me, independently viewed  No prior for comparison      MEDICATIONS GIVEN IN ER  Medications   sodium chloride 0.9 % flush 10 mL (not administered)   acetaminophen (TYLENOL) tablet 1,000 mg (1,000 mg Oral Given 1/8/19 1417)         PROGRESS AND CONSULTS     1220- UD=041/58. Discussed the plan to order lab work for further evaluation of the pt's symptoms. Pt understands and agrees with the plan, all questions answered.    1225- Ordered blood work, UA, T4, TSH, troponin, BNP and EKG for further evaluation.    1408-  Pt called out complaining of abd pain.  Ordered transvaginal US for further evaluation.    1412- Rechecked pt. Pt is complaining of  worsening left sided abd pain. Notified pt of the plan to order an US for further evaluation and pain medication. Pt understands and agrees with the plan, all questions answered.    1553: Spoke with Dr. Iraheta (radiologist) regarding US results showing gestational sac with no fetal pull.    1608: Patient discussed with OB on call, Dr. John, who states this is likely spontaneous /miscarraige. Advise discharge home and follow up tomorrow.     1615: DISCHARGE    Patient discharged in stable condition.    Reviewed implications of results, diagnosis, meds, responsibility to follow up, warning signs and symptoms of possible worsening, potential complications and reasons to return to ER.    Patient/Family voiced understanding of above instructions.    Discussed plan for discharge, as there is no emergent indication for admission. Patient referred to primary care provider for BP management due to today's BP. Pt is agreeable and understands need for follow up and repeat testing.  Pt is aware that discharge does not mean that nothing is wrong but it indicates no emergency is present that requires admission and they must continue care with follow-up tomorrow with OB as given below or physician of their choice.     FOLLOW-UP  Tang Kidd MD  [address] [address]  [phone]    Go in 1 day  for follow up         Medication List      No changes were made to your prescriptions during this visit.                     MEDICAL DECISION MAKING      MDM  Number of Diagnoses or Management Options  Left lower quadrant pain:   Palpitations:   Pregnancy of unknown anatomic location:   Diagnosis management comments: I believe HTN is spurious. HTN only at home monitor by wrist. None here.     She's not pregnant. Likely missed AB vs ectopic. Will f/u with OB tomorrow. Gave good RTER precautions.    I see no edema on exam.     ECG is without arrhythmia.        Amount and/or Complexity of Data  Reviewed  Clinical lab tests: ordered and reviewed (Hgb=11.1)  Tests in the radiology section of CPT®: ordered and reviewed (Gestational sac without fetal pole or yolk sac)  Tests in the medicine section of CPT®: ordered and reviewed (See EKG procedure note)  Discuss the patient with other providers: yes (Dr. Kidd (OB))  Independent visualization of images, tracings, or specimens: yes               DIAGNOSIS  Final diagnoses:   Pregnancy of unknown anatomic location   Left lower quadrant pain   Palpitations         DISPOSITION  Discharge            Latest Documented Vital Signs:  As of 4:27 PM  BP- 120/69 HR- 81 Temp- 97.9 °F (36.6 °C) (Tympanic) O2 sat- 100%        --  Documentation assistance provided by winston Pleitez for Dr. Crispin MD.  Information recorded by the scribe was done at my direction and has been verified and validated by me.     Jayla Pleitez  01/08/19 1415       Documentation assistance provided by winston Vivar for Dr. Crispin MD.  Information recorded by the scribe was done at my direction and has been verified and validated by me.         Marcia Vivar  01/08/19 1617       Marcia Vivar  01/08/19 1627       Thierno Roa II, MD  01/08/19 1916

## 2019-01-10 LAB — BACTERIA SPEC AEROBE CULT: ABNORMAL

## 2019-06-23 ENCOUNTER — HOSPITAL ENCOUNTER (EMERGENCY)
Facility: HOSPITAL | Age: 34
Discharge: HOME | End: 2019-06-23
Attending: EMERGENCY MEDICINE | Admitting: EMERGENCY MEDICINE

## 2019-06-23 VITALS
HEART RATE: 70 BPM | BODY MASS INDEX: 24.75 KG/M2 | HEIGHT: 66 IN | WEIGHT: 154 LBS | RESPIRATION RATE: 16 BRPM | SYSTOLIC BLOOD PRESSURE: 110 MMHG | OXYGEN SATURATION: 98 % | TEMPERATURE: 97.7 F | DIASTOLIC BLOOD PRESSURE: 56 MMHG

## 2019-06-23 DIAGNOSIS — M62.838 NECK MUSCLE SPASM: ICD-10-CM

## 2019-06-23 DIAGNOSIS — S16.1XXA CERVICAL STRAIN, ACUTE, INITIAL ENCOUNTER: Primary | ICD-10-CM

## 2019-06-23 PROCEDURE — 25010000002 TRIAMCINOLONE PER 10 MG: Performed by: NURSE PRACTITIONER

## 2019-06-23 PROCEDURE — 96372 THER/PROPH/DIAG INJ SC/IM: CPT

## 2019-06-23 PROCEDURE — 25010000002 KETOROLAC TROMETHAMINE PER 15 MG: Performed by: NURSE PRACTITIONER

## 2019-06-23 PROCEDURE — 99283 EMERGENCY DEPT VISIT LOW MDM: CPT

## 2019-06-23 RX ORDER — KETOROLAC TROMETHAMINE 30 MG/ML
30 INJECTION, SOLUTION INTRAMUSCULAR; INTRAVENOUS ONCE
Status: COMPLETED | OUTPATIENT
Start: 2019-06-23 | End: 2019-06-23

## 2019-06-23 RX ORDER — PREDNISONE 20 MG/1
TABLET ORAL
Qty: 18 TABLET | Refills: 0 | Status: SHIPPED | OUTPATIENT
Start: 2019-06-23 | End: 2020-02-03

## 2019-06-23 RX ORDER — CYCLOBENZAPRINE HCL 10 MG
10 TABLET ORAL 2 TIMES DAILY PRN
Qty: 12 TABLET | Refills: 0 | Status: SHIPPED | OUTPATIENT
Start: 2019-06-23 | End: 2020-02-03

## 2019-06-23 RX ORDER — TRIAMCINOLONE ACETONIDE 40 MG/ML
40 INJECTION, SUSPENSION INTRA-ARTICULAR; INTRAMUSCULAR ONCE
Status: COMPLETED | OUTPATIENT
Start: 2019-06-23 | End: 2019-06-23

## 2019-06-23 RX ORDER — CYCLOBENZAPRINE HCL 10 MG
10 TABLET ORAL ONCE
Status: COMPLETED | OUTPATIENT
Start: 2019-06-23 | End: 2019-06-23

## 2019-06-23 RX ORDER — GLIMEPIRIDE 2 MG/1
1 TABLET ORAL DAILY
COMMUNITY
End: 2020-02-03

## 2019-06-23 RX ORDER — MEDROXYPROGESTERONE ACETATE 10 MG/1
10 TABLET ORAL DAILY
COMMUNITY
Start: 2017-10-19 | End: 2020-02-03

## 2019-06-23 RX ORDER — NAPROXEN 500 MG/1
500 TABLET ORAL 2 TIMES DAILY WITH MEALS
Qty: 20 TABLET | Refills: 0 | Status: SHIPPED | OUTPATIENT
Start: 2019-06-23 | End: 2020-02-03

## 2019-06-23 RX ADMIN — TRIAMCINOLONE ACETONIDE 40 MG: 40 INJECTION, SUSPENSION INTRA-ARTICULAR; INTRAMUSCULAR at 15:52

## 2019-06-23 RX ADMIN — CYCLOBENZAPRINE 10 MG: 10 TABLET, FILM COATED ORAL at 15:51

## 2019-06-23 RX ADMIN — KETOROLAC TROMETHAMINE 30 MG: 30 INJECTION, SOLUTION INTRAMUSCULAR at 15:52

## 2019-09-21 ENCOUNTER — APPOINTMENT (OUTPATIENT)
Dept: ULTRASOUND IMAGING | Facility: HOSPITAL | Age: 34
End: 2019-09-21

## 2019-09-21 ENCOUNTER — HOSPITAL ENCOUNTER (EMERGENCY)
Facility: HOSPITAL | Age: 34
Discharge: HOME | End: 2019-09-21
Attending: EMERGENCY MEDICINE | Admitting: EMERGENCY MEDICINE

## 2019-09-21 VITALS
TEMPERATURE: 98.9 F | OXYGEN SATURATION: 96 % | RESPIRATION RATE: 16 BRPM | HEART RATE: 68 BPM | DIASTOLIC BLOOD PRESSURE: 65 MMHG | BODY MASS INDEX: 26.29 KG/M2 | SYSTOLIC BLOOD PRESSURE: 116 MMHG | HEIGHT: 65 IN | WEIGHT: 157.8 LBS

## 2019-09-21 DIAGNOSIS — Z3A.01 LESS THAN 8 WEEKS GESTATION OF PREGNANCY: Primary | ICD-10-CM

## 2019-09-21 DIAGNOSIS — R10.31 RIGHT LOWER QUADRANT ABDOMINAL PAIN: ICD-10-CM

## 2019-09-21 LAB
ALBUMIN SERPL-MCNC: 4.2 G/DL (ref 3.5–5.2)
ALBUMIN/GLOB SERPL: 1 G/DL
ALP SERPL-CCNC: 57 U/L (ref 39–117)
ALT SERPL W P-5'-P-CCNC: 16 U/L (ref 1–33)
ANION GAP SERPL CALCULATED.3IONS-SCNC: 9.1 MMOL/L (ref 5–15)
AST SERPL-CCNC: 20 U/L (ref 1–32)
BACTERIA UR QL AUTO: ABNORMAL /HPF
BASOPHILS # BLD AUTO: 0.01 10*3/MM3 (ref 0–0.2)
BASOPHILS NFR BLD AUTO: 0.3 % (ref 0–1.5)
BILIRUB SERPL-MCNC: 0.2 MG/DL (ref 0.2–1.2)
BILIRUB UR QL STRIP: NEGATIVE
BUN BLD-MCNC: 8 MG/DL (ref 6–20)
BUN/CREAT SERPL: 13.3 (ref 7–25)
CALCIUM SPEC-SCNC: 9.5 MG/DL (ref 8.6–10.5)
CHLORIDE SERPL-SCNC: 102 MMOL/L (ref 98–107)
CLARITY UR: CLEAR
CO2 SERPL-SCNC: 24.9 MMOL/L (ref 22–29)
COLOR UR: YELLOW
CREAT BLD-MCNC: 0.6 MG/DL (ref 0.57–1)
DEPRECATED RDW RBC AUTO: 45.6 FL (ref 37–54)
EOSINOPHIL # BLD AUTO: 0.03 10*3/MM3 (ref 0–0.4)
EOSINOPHIL NFR BLD AUTO: 1 % (ref 0.3–6.2)
ERYTHROCYTE [DISTWIDTH] IN BLOOD BY AUTOMATED COUNT: 15.7 % (ref 12.3–15.4)
GFR SERPL CREATININE-BSD FRML MDRD: 140 ML/MIN/1.73
GLOBULIN UR ELPH-MCNC: 4.4 GM/DL
GLUCOSE BLD-MCNC: 74 MG/DL (ref 65–99)
GLUCOSE UR STRIP-MCNC: NEGATIVE MG/DL
HCG INTACT+B SERPL-ACNC: 36.99 MIU/ML
HCT VFR BLD AUTO: 31.1 % (ref 34–46.6)
HGB BLD-MCNC: 9.5 G/DL (ref 12–15.9)
HGB UR QL STRIP.AUTO: NEGATIVE
HYALINE CASTS UR QL AUTO: ABNORMAL /LPF
IMM GRANULOCYTES # BLD AUTO: 0 10*3/MM3 (ref 0–0.05)
IMM GRANULOCYTES NFR BLD AUTO: 0 % (ref 0–0.5)
KETONES UR QL STRIP: NEGATIVE
LEUKOCYTE ESTERASE UR QL STRIP.AUTO: ABNORMAL
LIPASE SERPL-CCNC: 38 U/L (ref 13–60)
LYMPHOCYTES # BLD AUTO: 0.92 10*3/MM3 (ref 0.7–3.1)
LYMPHOCYTES NFR BLD AUTO: 31 % (ref 19.6–45.3)
MCH RBC QN AUTO: 24.7 PG (ref 26.6–33)
MCHC RBC AUTO-ENTMCNC: 30.5 G/DL (ref 31.5–35.7)
MCV RBC AUTO: 80.8 FL (ref 79–97)
MONOCYTES # BLD AUTO: 0.33 10*3/MM3 (ref 0.1–0.9)
MONOCYTES NFR BLD AUTO: 11.1 % (ref 5–12)
NEUTROPHILS # BLD AUTO: 1.68 10*3/MM3 (ref 1.7–7)
NEUTROPHILS NFR BLD AUTO: 56.6 % (ref 42.7–76)
NITRITE UR QL STRIP: NEGATIVE
NRBC BLD AUTO-RTO: 0 /100 WBC (ref 0–0.2)
PH UR STRIP.AUTO: 5.5 [PH] (ref 5–8)
PLATELET # BLD AUTO: 265 10*3/MM3 (ref 140–450)
PMV BLD AUTO: 10.9 FL (ref 6–12)
POTASSIUM BLD-SCNC: 4.1 MMOL/L (ref 3.5–5.2)
PROT SERPL-MCNC: 8.6 G/DL (ref 6–8.5)
PROT UR QL STRIP: NEGATIVE
RBC # BLD AUTO: 3.85 10*6/MM3 (ref 3.77–5.28)
RBC # UR: ABNORMAL /HPF
REF LAB TEST METHOD: ABNORMAL
SODIUM BLD-SCNC: 136 MMOL/L (ref 136–145)
SP GR UR STRIP: 1.01 (ref 1–1.03)
SQUAMOUS #/AREA URNS HPF: ABNORMAL /HPF
UROBILINOGEN UR QL STRIP: ABNORMAL
WBC NRBC COR # BLD: 2.97 10*3/MM3 (ref 3.4–10.8)
WBC UR QL AUTO: ABNORMAL /HPF

## 2019-09-21 PROCEDURE — 80053 COMPREHEN METABOLIC PANEL: CPT | Performed by: EMERGENCY MEDICINE

## 2019-09-21 PROCEDURE — 85025 COMPLETE CBC W/AUTO DIFF WBC: CPT | Performed by: EMERGENCY MEDICINE

## 2019-09-21 PROCEDURE — 93976 VASCULAR STUDY: CPT

## 2019-09-21 PROCEDURE — 84702 CHORIONIC GONADOTROPIN TEST: CPT | Performed by: EMERGENCY MEDICINE

## 2019-09-21 PROCEDURE — 83690 ASSAY OF LIPASE: CPT | Performed by: EMERGENCY MEDICINE

## 2019-09-21 PROCEDURE — 81001 URINALYSIS AUTO W/SCOPE: CPT | Performed by: EMERGENCY MEDICINE

## 2019-09-21 PROCEDURE — 76815 OB US LIMITED FETUS(S): CPT

## 2019-09-21 PROCEDURE — 87086 URINE CULTURE/COLONY COUNT: CPT | Performed by: EMERGENCY MEDICINE

## 2019-09-21 PROCEDURE — 99284 EMERGENCY DEPT VISIT MOD MDM: CPT

## 2019-09-21 PROCEDURE — 76817 TRANSVAGINAL US OBSTETRIC: CPT

## 2019-09-21 RX ORDER — ACETAMINOPHEN 500 MG
1000 TABLET ORAL ONCE
Status: COMPLETED | OUTPATIENT
Start: 2019-09-21 | End: 2019-09-21

## 2019-09-21 RX ORDER — SODIUM CHLORIDE 0.9 % (FLUSH) 0.9 %
10 SYRINGE (ML) INJECTION AS NEEDED
Status: DISCONTINUED | OUTPATIENT
Start: 2019-09-21 | End: 2019-09-21 | Stop reason: HOSPADM

## 2019-09-21 RX ADMIN — ACETAMINOPHEN 1000 MG: 500 TABLET, FILM COATED ORAL at 17:24

## 2019-09-21 NOTE — ED TRIAGE NOTES
Pt reports RLQ abd pain and dizziness starting yesterday. Pt reports she took a home pregnancy test which was positive.

## 2019-09-21 NOTE — ED NOTES
"Patient presents with lower abdominal pain on left sided that started yesterday.  Patient notes she took a pregnancy test yesterday, and the result was positive.  Patient also complains of intermittent nausea, patient states \"I also feel like when I walk, objects around me are moving.\"  Patient denies any vomiting or falls.  Denies any abdominal pain at this time, as it is intermittent, states \"it feels like air in my belly.\"  Patient breathing is even and unlabored.  NAD at this time.   Notes this is her 3rd pregnancy, 1 , 1 miscarriage.  Denies any vaginal bleeding.      Newton Cazares RN  [phone]    "

## 2019-09-22 NOTE — ED PROVIDER NOTES
EMERGENCY DEPARTMENT ENCOUNTER    Room Number:    Date of encounter:  2019  PCP: System, Provider Not In  Historian: patient      HPI:  Chief Complaint: abd pain  A complete HPI/ROS/PMH/PSH/SH/FH are unobtainable due to: none    Context: Elza Arcos is a 34 y.o. female who presents to the ED c/o abd pain. Onset yesterday. Right lower quadrant. It feels like air pressure. Constant. Non-radiating. No fever, vomiting, diarrhea, urinary sx, constipation, vaginal discharge or bleeding. She is . Unsure of LMP. Has tried to medicines at home.       PAST MEDICAL HISTORY  Active Ambulatory Problems     Diagnosis Date Noted   • No Active Ambulatory Problems     Resolved Ambulatory Problems     Diagnosis Date Noted   • No Resolved Ambulatory Problems     Past Medical History:   Diagnosis Date   •     • Diabetes mellitus (CMS/HCC)    • Glaucoma    • Miscarriage    • Polycystic ovarian syndrome          PAST SURGICAL HISTORY  Past Surgical History:   Procedure Laterality Date   • DILATATION AND CURETTAGE     • LIPOSUCTION           FAMILY HISTORY  Family History   Problem Relation Age of Onset   • Hypertension Father    • Hypertension Mother    • Diabetes Mother    • Hyperthyroidism Mother          SOCIAL HISTORY  Social History     Socioeconomic History   • Marital status: Single     Spouse name: Not on file   • Number of children: Not on file   • Years of education: Not on file   • Highest education level: Not on file   Tobacco Use   • Smoking status: Never Smoker   • Smokeless tobacco: Never Used   Substance and Sexual Activity   • Alcohol use: No     Frequency: Never     Comment: not since being preg   • Drug use: No   • Sexual activity: Yes     Partners: Male         ALLERGIES  Patient has no known allergies.        REVIEW OF SYSTEMS  Review of Systems     All systems reviewed and negative except for those discussed in HPI.       PHYSICAL EXAM    I have reviewed the triage vital signs and nursing  notes.    ED Triage Vitals   Temp Heart Rate Resp BP SpO2   09/21/19 1400 09/21/19 1400 09/21/19 1400 09/21/19 1533 09/21/19 1400   98.9 °F (37.2 °C) 81 18 115/74 100 %      Temp src Heart Rate Source Patient Position BP Location FiO2 (%)   09/21/19 1400 09/21/19 1400 -- -- --   Tympanic Monitor          Physical Exam  GENERAL: not distressed  HENT: nares patent  EYES: no scleral icterus  CV: regular rhythm, regular rate  RESPIRATORY: normal effort ctab  ABDOMEN: soft, RLQ ttp, no rebound or guarding  MUSCULOSKELETAL: no deformity  NEURO: alert, moves all extremities, follows commands  SKIN: warm, dry        LAB RESULTS  Recent Results (from the past 24 hour(s))   Comprehensive Metabolic Panel    Collection Time: 09/21/19  3:54 PM   Result Value Ref Range    Glucose 74 65 - 99 mg/dL    BUN 8 6 - 20 mg/dL    Creatinine 0.60 0.57 - 1.00 mg/dL    Sodium 136 136 - 145 mmol/L    Potassium 4.1 3.5 - 5.2 mmol/L    Chloride 102 98 - 107 mmol/L    CO2 24.9 22.0 - 29.0 mmol/L    Calcium 9.5 8.6 - 10.5 mg/dL    Total Protein 8.6 (H) 6.0 - 8.5 g/dL    Albumin 4.20 3.50 - 5.20 g/dL    ALT (SGPT) 16 1 - 33 U/L    AST (SGOT) 20 1 - 32 U/L    Alkaline Phosphatase 57 39 - 117 U/L    Total Bilirubin 0.2 0.2 - 1.2 mg/dL    eGFR  African Amer 140 >60 mL/min/1.73    Globulin 4.4 gm/dL    A/G Ratio 1.0 g/dL    BUN/Creatinine Ratio 13.3 7.0 - 25.0    Anion Gap 9.1 5.0 - 15.0 mmol/L   Lipase    Collection Time: 09/21/19  3:54 PM   Result Value Ref Range    Lipase 38 13 - 60 U/L   hCG, Quantitative, Pregnancy    Collection Time: 09/21/19  3:54 PM   Result Value Ref Range    HCG Quantitative 36.99 mIU/mL   CBC Auto Differential    Collection Time: 09/21/19  3:54 PM   Result Value Ref Range    WBC 2.97 (L) 3.40 - 10.80 10*3/mm3    RBC 3.85 3.77 - 5.28 10*6/mm3    Hemoglobin 9.5 (L) 12.0 - 15.9 g/dL    Hematocrit 31.1 (L) 34.0 - 46.6 %    MCV 80.8 79.0 - 97.0 fL    MCH 24.7 (L) 26.6 - 33.0 pg    MCHC 30.5 (L) 31.5 - 35.7 g/dL    RDW 15.7 (H)  12.3 - 15.4 %    RDW-SD 45.6 37.0 - 54.0 fl    MPV 10.9 6.0 - 12.0 fL    Platelets 265 140 - 450 10*3/mm3    Neutrophil % 56.6 42.7 - 76.0 %    Lymphocyte % 31.0 19.6 - 45.3 %    Monocyte % 11.1 5.0 - 12.0 %    Eosinophil % 1.0 0.3 - 6.2 %    Basophil % 0.3 0.0 - 1.5 %    Immature Grans % 0.0 0.0 - 0.5 %    Neutrophils, Absolute 1.68 (L) 1.70 - 7.00 10*3/mm3    Lymphocytes, Absolute 0.92 0.70 - 3.10 10*3/mm3    Monocytes, Absolute 0.33 0.10 - 0.90 10*3/mm3    Eosinophils, Absolute 0.03 0.00 - 0.40 10*3/mm3    Basophils, Absolute 0.01 0.00 - 0.20 10*3/mm3    Immature Grans, Absolute 0.00 0.00 - 0.05 10*3/mm3    nRBC 0.0 0.0 - 0.2 /100 WBC   Urinalysis With Culture If Indicated - Urine, Clean Catch    Collection Time: 09/21/19  5:25 PM   Result Value Ref Range    Color, UA Yellow Yellow, Straw    Appearance, UA Clear Clear    pH, UA 5.5 5.0 - 8.0    Specific Gravity, UA 1.010 1.005 - 1.030    Glucose, UA Negative Negative    Ketones, UA Negative Negative    Bilirubin, UA Negative Negative    Blood, UA Negative Negative    Protein, UA Negative Negative    Leuk Esterase, UA Trace (A) Negative    Nitrite, UA Negative Negative    Urobilinogen, UA 0.2 E.U./dL 0.2 - 1.0 E.U./dL   Urinalysis, Microscopic Only - Urine, Clean Catch    Collection Time: 09/21/19  5:25 PM   Result Value Ref Range    RBC, UA None Seen None Seen, 0-2 /HPF    WBC, UA 0-2 None Seen, 0-2 /HPF    Bacteria, UA None Seen None Seen /HPF    Squamous Epithelial Cells, UA 3-6 (A) None Seen, 0-2 /HPF    Hyaline Casts, UA None Seen None Seen /LPF    Methodology Manual Light Microscopy        Ordered the above labs and independently reviewed the results.        RADIOLOGY  Us Ob Limited 1 + Fetuses    Result Date: 9/21/2019  LIMITED FIRST TRIMESTER OBSTETRIC SONOGRAPHY  HISTORY: 34-year-old female with a positive pregnancy test at home today and a quantitative beta-hCG level of 37 presenting with right lower quadrant pain.  FINDINGS: Transabdominal and  transvaginal pelvic sonography was performed. The uterus is anteflexed and measures 7.6 x 3.5 x 4.5 cm. There is a thickened endometrium that measures on the order of 1 cm in thickness. No evidence for a gestational sac is appreciated.  The right ovary measures 3.3 x 2.4 x 3.7 cm and has a normal appearance with normal internal vascularity. The left ovary measures 3.2 x 2.5 x 3.1 cm and has normal internal vascularity. There is a 1.8 cm round hypoechoic lesion within the left ovary with peripheral vascularity. A small amount of free fluid is seen within the pelvic cul-de-sac.      There is a thickened endometrium which may represent a decidual reaction. No gestational sac is appreciated. A 1.8 cm complex cystic lesion in the left ovary is noted and this may represent a corpus luteum cyst of pregnancy. The imaging features may represent an early intrauterine pregnancy. Clinical follow-up with repeat pelvic sonography as clinically indicated is recommended.  This report was finalized on 9/21/2019 8:02 PM by Dr. Drew Meeks M.D.        I ordered the above noted radiological studies. Reviewed by me and discussed with radiologist.  See dictation for official radiology interpretation.      PROCEDURES    Procedures      MEDICATIONS GIVEN IN ER    Medications   acetaminophen (TYLENOL) tablet 1,000 mg (1,000 mg Oral Given 9/21/19 1724)         PROGRESS, DATA ANALYSIS, CONSULTS, AND MEDICAL DECISION MAKING    All labs have been independently reviewed by me.  All radiology studies have been reviewed by me and discussed with radiologist dictating the report.   EKG's independently viewed and interpreted by me.  Discussion below represents my analysis of pertinent findings related to patient's condition, differential diagnosis, treatment plan and final disposition.      ED Course as of Sep 21 2220   Sat Sep 21, 2019   1600 Patient with 1 day of right lower quadrant pain.  She has tenderness but no rebound or guarding.  She is  pregnant but is unsure of her last menstrual period.  She is G3, P0.  She has had a prior  and then a miscarriage.  [TD]   1721 Per Us, suspected early pregnancy.   [TD]   1721 Low WBC WBC: (!) 2.97 [TD]   1722 HCG Quantitative: 36.99 [TD]      ED Course User Index  [TD] Thierno Roa II, MD     I gave patient good RTER precautions, especially signs of possible ectopic pregnancy. Repeat abd exam is soft and minimally tender. I don't think CT or MRI are clinically indicated at this time. She will follow up with OB.     AS OF 10:20 PM VITALS:    BP - 116/65  HR - 68  TEMP - 98.9 °F (37.2 °C) (Tympanic)  02 SATS - 96%        DIAGNOSIS  Final diagnoses:   Less than 8 weeks gestation of pregnancy   Right lower quadrant abdominal pain         DISPOSITION  DISCHARGE    FOLLOW-UP  Myate Bentley MD  [address] [id number] [phone]    Schedule an appointment as soon as possible for a visit in 2 days  for follow up of your pregnant         Medication List      No changes were made to your prescriptions during this visit.              Thierno Roa II, MD  [phone]

## 2019-09-23 LAB — BACTERIA SPEC AEROBE CULT: NORMAL

## 2019-11-18 ENCOUNTER — HOSPITAL ENCOUNTER (OUTPATIENT)
Dept: OTHER | Facility: HOSPITAL | Age: 34
Discharge: HOME | End: 2019-11-18
Attending: SPECIALIST

## 2019-11-18 LAB
ABO GROUP BLD: NORMAL
CONV ABD CONTROL: NORMAL
RH BLD: NORMAL

## 2020-01-16 ENCOUNTER — TELEPHONE (OUTPATIENT)
Dept: OBSTETRICS AND GYNECOLOGY | Facility: CLINIC | Age: 35
End: 2020-01-16

## 2020-01-16 NOTE — TELEPHONE ENCOUNTER
Hui,     Fine with me. Please arrange for her to see us soon.     Thanks   Dr. López    Patient is 20 wks, having to switch providers due to her current one no longer accepting her insurance.  No complications other than anemic.     Previous doctor is Dr. Nelson

## 2020-01-20 ENCOUNTER — TELEPHONE (OUTPATIENT)
Dept: OBSTETRICS AND GYNECOLOGY | Facility: CLINIC | Age: 35
End: 2020-01-20

## 2020-01-20 NOTE — TELEPHONE ENCOUNTER
L/m for pt to call.     Transferring from Dr. Harper in [place].  Pt will need to come to office and sign ELIANA to fax to their office for records.  Scheduled for ON appt w/Dr. López on 2/3/2020.    Pt # [phone]

## 2020-01-24 ENCOUNTER — APPOINTMENT (OUTPATIENT)
Dept: CARDIOLOGY | Facility: HOSPITAL | Age: 35
End: 2020-01-24

## 2020-01-24 ENCOUNTER — APPOINTMENT (OUTPATIENT)
Dept: GENERAL RADIOLOGY | Facility: HOSPITAL | Age: 35
End: 2020-01-24

## 2020-01-24 ENCOUNTER — HOSPITAL ENCOUNTER (EMERGENCY)
Facility: HOSPITAL | Age: 35
Discharge: HOME | End: 2020-01-24
Attending: EMERGENCY MEDICINE | Admitting: EMERGENCY MEDICINE

## 2020-01-24 VITALS
HEIGHT: 66 IN | OXYGEN SATURATION: 100 % | TEMPERATURE: 98 F | BODY MASS INDEX: 26.68 KG/M2 | DIASTOLIC BLOOD PRESSURE: 67 MMHG | SYSTOLIC BLOOD PRESSURE: 117 MMHG | RESPIRATION RATE: 26 BRPM | WEIGHT: 166 LBS | HEART RATE: 94 BPM

## 2020-01-24 DIAGNOSIS — R07.89 CHEST WALL PAIN: Primary | ICD-10-CM

## 2020-01-24 DIAGNOSIS — Z34.90 PREGNANCY, UNSPECIFIED GESTATIONAL AGE: ICD-10-CM

## 2020-01-24 LAB
ALBUMIN SERPL-MCNC: 3.9 G/DL (ref 3.5–5.2)
ALBUMIN/GLOB SERPL: 1 G/DL
ALP SERPL-CCNC: 57 U/L (ref 39–117)
ALT SERPL W P-5'-P-CCNC: 16 U/L (ref 1–33)
ANION GAP SERPL CALCULATED.3IONS-SCNC: 11.1 MMOL/L (ref 5–15)
AST SERPL-CCNC: 19 U/L (ref 1–32)
BASOPHILS # BLD AUTO: 0 10*3/MM3 (ref 0–0.2)
BASOPHILS NFR BLD AUTO: 0 % (ref 0–1.5)
BH CV LOWER VASCULAR LEFT COMMON FEMORAL AUGMENT: NORMAL
BH CV LOWER VASCULAR LEFT COMMON FEMORAL COMPETENT: NORMAL
BH CV LOWER VASCULAR LEFT COMMON FEMORAL COMPRESS: NORMAL
BH CV LOWER VASCULAR LEFT COMMON FEMORAL PHASIC: NORMAL
BH CV LOWER VASCULAR LEFT COMMON FEMORAL SPONT: NORMAL
BH CV LOWER VASCULAR LEFT DISTAL FEMORAL COMPRESS: NORMAL
BH CV LOWER VASCULAR LEFT GASTRONEMIUS COMPRESS: NORMAL
BH CV LOWER VASCULAR LEFT GREATER SAPH AK COMPRESS: NORMAL
BH CV LOWER VASCULAR LEFT GREATER SAPH BK COMPRESS: NORMAL
BH CV LOWER VASCULAR LEFT MID FEMORAL AUGMENT: NORMAL
BH CV LOWER VASCULAR LEFT MID FEMORAL COMPETENT: NORMAL
BH CV LOWER VASCULAR LEFT MID FEMORAL COMPRESS: NORMAL
BH CV LOWER VASCULAR LEFT MID FEMORAL PHASIC: NORMAL
BH CV LOWER VASCULAR LEFT MID FEMORAL SPONT: NORMAL
BH CV LOWER VASCULAR LEFT PERONEAL COMPRESS: NORMAL
BH CV LOWER VASCULAR LEFT POPLITEAL AUGMENT: NORMAL
BH CV LOWER VASCULAR LEFT POPLITEAL COMPETENT: NORMAL
BH CV LOWER VASCULAR LEFT POPLITEAL COMPRESS: NORMAL
BH CV LOWER VASCULAR LEFT POPLITEAL PHASIC: NORMAL
BH CV LOWER VASCULAR LEFT POPLITEAL SPONT: NORMAL
BH CV LOWER VASCULAR LEFT POSTERIOR TIBIAL COMPRESS: NORMAL
BH CV LOWER VASCULAR LEFT PROFUNDA FEMORAL COMPRESS: NORMAL
BH CV LOWER VASCULAR LEFT PROXIMAL FEMORAL COMPRESS: NORMAL
BH CV LOWER VASCULAR LEFT SAPHENOFEMORAL JUNCTION COMPRESS: NORMAL
BH CV LOWER VASCULAR RIGHT COMMON FEMORAL AUGMENT: NORMAL
BH CV LOWER VASCULAR RIGHT COMMON FEMORAL COMPETENT: NORMAL
BH CV LOWER VASCULAR RIGHT COMMON FEMORAL COMPRESS: NORMAL
BH CV LOWER VASCULAR RIGHT COMMON FEMORAL PHASIC: NORMAL
BH CV LOWER VASCULAR RIGHT COMMON FEMORAL SPONT: NORMAL
BH CV LOWER VASCULAR RIGHT DISTAL FEMORAL COMPRESS: NORMAL
BH CV LOWER VASCULAR RIGHT GASTRONEMIUS COMPRESS: NORMAL
BH CV LOWER VASCULAR RIGHT GREATER SAPH AK COMPRESS: NORMAL
BH CV LOWER VASCULAR RIGHT GREATER SAPH BK COMPRESS: NORMAL
BH CV LOWER VASCULAR RIGHT MID FEMORAL AUGMENT: NORMAL
BH CV LOWER VASCULAR RIGHT MID FEMORAL COMPETENT: NORMAL
BH CV LOWER VASCULAR RIGHT MID FEMORAL COMPRESS: NORMAL
BH CV LOWER VASCULAR RIGHT MID FEMORAL PHASIC: NORMAL
BH CV LOWER VASCULAR RIGHT MID FEMORAL SPONT: NORMAL
BH CV LOWER VASCULAR RIGHT PERONEAL COMPRESS: NORMAL
BH CV LOWER VASCULAR RIGHT POPLITEAL AUGMENT: NORMAL
BH CV LOWER VASCULAR RIGHT POPLITEAL COMPETENT: NORMAL
BH CV LOWER VASCULAR RIGHT POPLITEAL COMPRESS: NORMAL
BH CV LOWER VASCULAR RIGHT POPLITEAL PHASIC: NORMAL
BH CV LOWER VASCULAR RIGHT POPLITEAL SPONT: NORMAL
BH CV LOWER VASCULAR RIGHT POSTERIOR TIBIAL COMPRESS: NORMAL
BH CV LOWER VASCULAR RIGHT PROFUNDA FEMORAL COMPRESS: NORMAL
BH CV LOWER VASCULAR RIGHT PROXIMAL FEMORAL COMPRESS: NORMAL
BH CV LOWER VASCULAR RIGHT SAPHENOFEMORAL JUNCTION COMPRESS: NORMAL
BILIRUB SERPL-MCNC: 0.2 MG/DL (ref 0.2–1.2)
BUN BLD-MCNC: 5 MG/DL (ref 6–20)
BUN/CREAT SERPL: 8.9 (ref 7–25)
CALCIUM SPEC-SCNC: 9.5 MG/DL (ref 8.6–10.5)
CHLORIDE SERPL-SCNC: 99 MMOL/L (ref 98–107)
CO2 SERPL-SCNC: 24.9 MMOL/L (ref 22–29)
CREAT BLD-MCNC: 0.56 MG/DL (ref 0.57–1)
D DIMER PPP FEU-MCNC: 2.06 MCGFEU/ML (ref 0–0.49)
DEPRECATED RDW RBC AUTO: 44.4 FL (ref 37–54)
EOSINOPHIL # BLD AUTO: 0.01 10*3/MM3 (ref 0–0.4)
EOSINOPHIL NFR BLD AUTO: 0.1 % (ref 0.3–6.2)
ERYTHROCYTE [DISTWIDTH] IN BLOOD BY AUTOMATED COUNT: 14.1 % (ref 12.3–15.4)
GFR SERPL CREATININE-BSD FRML MDRD: >150 ML/MIN/1.73
GLOBULIN UR ELPH-MCNC: 4.1 GM/DL
GLUCOSE BLD-MCNC: 106 MG/DL (ref 65–99)
HCT VFR BLD AUTO: 31.5 % (ref 34–46.6)
HGB BLD-MCNC: 9.9 G/DL (ref 12–15.9)
HOLD SPECIMEN: NORMAL
IMM GRANULOCYTES # BLD AUTO: 0.01 10*3/MM3 (ref 0–0.05)
IMM GRANULOCYTES NFR BLD AUTO: 0.1 % (ref 0–0.5)
LYMPHOCYTES # BLD AUTO: 0.86 10*3/MM3 (ref 0.7–3.1)
LYMPHOCYTES NFR BLD AUTO: 12.1 % (ref 19.6–45.3)
MCH RBC QN AUTO: 26.7 PG (ref 26.6–33)
MCHC RBC AUTO-ENTMCNC: 31.4 G/DL (ref 31.5–35.7)
MCV RBC AUTO: 84.9 FL (ref 79–97)
MONOCYTES # BLD AUTO: 0.45 10*3/MM3 (ref 0.1–0.9)
MONOCYTES NFR BLD AUTO: 6.3 % (ref 5–12)
NEUTROPHILS # BLD AUTO: 5.78 10*3/MM3 (ref 1.7–7)
NEUTROPHILS NFR BLD AUTO: 81.4 % (ref 42.7–76)
NRBC BLD AUTO-RTO: 0 /100 WBC (ref 0–0.2)
PLATELET # BLD AUTO: 244 10*3/MM3 (ref 140–450)
PMV BLD AUTO: 10.6 FL (ref 6–12)
POTASSIUM BLD-SCNC: 4.2 MMOL/L (ref 3.5–5.2)
PROT SERPL-MCNC: 8 G/DL (ref 6–8.5)
RBC # BLD AUTO: 3.71 10*6/MM3 (ref 3.77–5.28)
SODIUM BLD-SCNC: 135 MMOL/L (ref 136–145)
WBC NRBC COR # BLD: 7.11 10*3/MM3 (ref 3.4–10.8)

## 2020-01-24 PROCEDURE — 85025 COMPLETE CBC W/AUTO DIFF WBC: CPT | Performed by: EMERGENCY MEDICINE

## 2020-01-24 PROCEDURE — 93970 EXTREMITY STUDY: CPT

## 2020-01-24 PROCEDURE — 93005 ELECTROCARDIOGRAM TRACING: CPT

## 2020-01-24 PROCEDURE — 80053 COMPREHEN METABOLIC PANEL: CPT | Performed by: EMERGENCY MEDICINE

## 2020-01-24 PROCEDURE — 71046 X-RAY EXAM CHEST 2 VIEWS: CPT

## 2020-01-24 PROCEDURE — 93005 ELECTROCARDIOGRAM TRACING: CPT | Performed by: EMERGENCY MEDICINE

## 2020-01-24 PROCEDURE — 85379 FIBRIN DEGRADATION QUANT: CPT | Performed by: EMERGENCY MEDICINE

## 2020-01-24 PROCEDURE — 99283 EMERGENCY DEPT VISIT LOW MDM: CPT

## 2020-01-24 PROCEDURE — 93010 ELECTROCARDIOGRAM REPORT: CPT | Performed by: INTERNAL MEDICINE

## 2020-01-24 RX ORDER — ACETAMINOPHEN 500 MG
1000 TABLET ORAL ONCE
Status: COMPLETED | OUTPATIENT
Start: 2020-01-24 | End: 2020-01-24

## 2020-01-24 RX ORDER — SODIUM CHLORIDE 0.9 % (FLUSH) 0.9 %
10 SYRINGE (ML) INJECTION AS NEEDED
Status: DISCONTINUED | OUTPATIENT
Start: 2020-01-24 | End: 2020-01-24 | Stop reason: HOSPADM

## 2020-01-24 RX ADMIN — ACETAMINOPHEN 1000 MG: 500 TABLET, FILM COATED ORAL at 12:55

## 2020-02-03 ENCOUNTER — INITIAL PRENATAL (OUTPATIENT)
Dept: OBSTETRICS AND GYNECOLOGY | Facility: CLINIC | Age: 35
End: 2020-02-03

## 2020-02-03 VITALS — WEIGHT: 155 LBS | DIASTOLIC BLOOD PRESSURE: 67 MMHG | BODY MASS INDEX: 25.4 KG/M2 | SYSTOLIC BLOOD PRESSURE: 119 MMHG

## 2020-02-03 DIAGNOSIS — R76.8 POSITIVE COOMBS TEST: ICD-10-CM

## 2020-02-03 DIAGNOSIS — O24.112 PRE-EXISTING TYPE 2 DIABETES MELLITUS DURING PREGNANCY IN SECOND TRIMESTER: ICD-10-CM

## 2020-02-03 DIAGNOSIS — Z34.02 ENCOUNTER FOR SUPERVISION OF NORMAL FIRST PREGNANCY IN SECOND TRIMESTER: Primary | ICD-10-CM

## 2020-02-03 LAB
EXTERNAL ABO GROUPING: ABNORMAL
EXTERNAL ANTIBODY SCREEN: ABNORMAL
EXTERNAL HEPATITIS B SURFACE ANTIGEN: NEGATIVE
EXTERNAL RH FACTOR: POSITIVE
EXTERNAL SYPHILIS RPR SCREEN: ABNORMAL
GLUCOSE UR STRIP-MCNC: NEGATIVE MG/DL
HCV AB S/CO SERPL IA: NORMAL
HIV 1+2 AB+HIV1 P24 AG SERPL QL IA: NORMAL
PROT UR STRIP-MCNC: NEGATIVE MG/DL
RUBV IGG SERPL IA-ACNC: ABNORMAL
VZV IGG SER QL: <0.91

## 2020-02-03 PROCEDURE — 99214 OFFICE O/P EST MOD 30 MIN: CPT | Performed by: OBSTETRICS & GYNECOLOGY

## 2020-02-03 RX ORDER — PNV NO.95/FERROUS FUM/FOLIC AC 28MG-0.8MG
TABLET ORAL
COMMUNITY
End: 2020-07-14

## 2020-02-03 RX ORDER — GLUCOSAMINE HCL/CHONDROITIN SU 500-400 MG
1 CAPSULE ORAL 4 TIMES DAILY
Qty: 120 EACH | Refills: 3 | Status: SHIPPED | OUTPATIENT
Start: 2020-02-03

## 2020-02-03 RX ORDER — FOLIC ACID 1 MG/1
1 TABLET ORAL DAILY
COMMUNITY

## 2020-02-03 RX ORDER — PNV,CALCIUM 72/IRON/FOLIC ACID 27 MG-1 MG
TABLET ORAL
COMMUNITY
Start: 2020-01-10 | End: 2020-02-19 | Stop reason: SDUPTHER

## 2020-02-03 RX ORDER — ONDANSETRON 4 MG/1
TABLET, FILM COATED ORAL
COMMUNITY
Start: 2019-12-22 | End: 2020-03-04 | Stop reason: SDUPTHER

## 2020-02-03 RX ORDER — LANCETS 30 GAUGE
1 EACH MISCELLANEOUS 4 TIMES DAILY
Qty: 120 EACH | Refills: 3 | Status: SHIPPED | OUTPATIENT
Start: 2020-02-03 | End: 2020-06-08 | Stop reason: SDUPTHER

## 2020-02-03 RX ORDER — BLOOD-GLUCOSE METER
1 KIT MISCELLANEOUS 4 TIMES DAILY
Qty: 1 EACH | Refills: 0 | Status: SHIPPED | OUTPATIENT
Start: 2020-02-03 | End: 2020-03-04

## 2020-02-03 RX ORDER — METFORMIN HYDROCHLORIDE 500 MG/1
500 TABLET, EXTENDED RELEASE ORAL DAILY
COMMUNITY
End: 2020-02-19 | Stop reason: SDUPTHER

## 2020-02-03 RX ORDER — OMEPRAZOLE 40 MG/1
40 CAPSULE, DELAYED RELEASE ORAL DAILY
COMMUNITY
End: 2020-03-04

## 2020-02-03 NOTE — PROGRESS NOTES
Initial ob visit     CC- Here for care of pregnancy     Elza Arcos is being seen today for her first obstetrical visit.  She is a 34 y.o.    22w1d gestation.     #: 1, Date: 19, Sex: None, Weight: None, GA: None, Delivery: None, Apgar1: None, Apgar5: None, Living: None, Birth Comments: None    #: 2, Date: None, Sex: None, Weight: None, GA: None, Delivery: None, Apgar1: None, Apgar5: None, Living: None, Birth Comments: None      Current obstetric complaints : Rapid heart rate-feels in her sleep and when laying down.   Prior obstetric issues, potential pregnancy concerns: Anemia, DM  Family history of genetic issues (includes FOB): none  Prior infections concerning in pregnancy (Rash, fever in last 2 weeks): none  Varicella Hx : Borderline  Prior testing for Cystic Fibrosis Carrier or Sickle Cell Trait- normal  Prepregnancy BMI - Body mass index is 25.4 kg/m².    Past Medical History:   Diagnosis Date   •     • Anemia    • Diabetes mellitus (CMS/HCC)    • Glaucoma    • Miscarriage    • Polycystic ovarian syndrome        Past Surgical History:   Procedure Laterality Date   • DILATATION AND CURETTAGE     • LIPOSUCTION           Current Outpatient Medications:   •  omeprazole (priLOSEC) 40 MG capsule, Take 40 mg by mouth Daily., Disp: , Rfl:   •  ferrous sulfate 325 (65 Fe) MG tablet, Take  by mouth., Disp: , Rfl:   •  folic acid (FOLVITE) 1 MG tablet, Take 1 mg by mouth Daily., Disp: , Rfl:   •  metFORMIN ER (GLUCOPHAGE-XR) 500 MG 24 hr tablet, Take 500 mg by mouth Daily., Disp: , Rfl:   •  ondansetron (ZOFRAN) 4 MG tablet, , Disp: , Rfl:   •  Prenatal Vit-Fe Fumarate-FA (PREPLUS) 27-1 MG tablet, , Disp: , Rfl:   •  vitamin d (CHOLECALIFEROL) 125 MCG (5000 UT) capsule, Take 5,000 Units by mouth Daily., Disp: , Rfl:     No Known Allergies    Social History     Socioeconomic History   • Marital status: Single     Spouse name: Not on file   • Number of children: Not on file   • Years of education:  Not on file   • Highest education level: Not on file   Tobacco Use   • Smoking status: Never Smoker   • Smokeless tobacco: Never Used   Substance and Sexual Activity   • Alcohol use: No     Frequency: Never     Comment: not since being preg   • Drug use: No   • Sexual activity: Yes     Partners: Male       Family History   Problem Relation Age of Onset   • Hypertension Father    • Hypertension Mother    • Diabetes Mother    • Hyperthyroidism Mother    • Breast cancer Neg Hx    • Ovarian cancer Neg Hx    • Uterine cancer Neg Hx    • Colon cancer Neg Hx    • Deep vein thrombosis Neg Hx    • Pulmonary embolism Neg Hx        Review of systems     A comprehensive review of systems was negative.     Objective    /67   Wt 70.3 kg (155 lb)   LMP 08/20/2019   BMI 25.40 kg/m²       General Appearance:    Alert, cooperative, in no acute distress, habitus normal    Head:    Normocephalic, without obvious abnormality, atraumatic   Eyes:            Lids and lashes normal, conjunctivae and sclerae normal, no   icterus, no pallor, corneas clear   Ears:    Ears appear intact with no abnormalities noted       Neck:   No adenopathy, supple, trachea midline, no thyromegaly   Back:     No kyphosis present, no scoliosis present,                       Lungs:     Clear to auscultation,respirations regular, even and                   unlabored    Heart:    Regular rhythm and normal rate, normal S1 and S2, no            murmur, no gallop, no rub, no click   Breast Exam:    Deferred    Abdomen:     Normal bowel sounds, no masses, no organomegaly, soft        non-tender, non-distended, no guarding, no rebound                 tenderness   Genitalia:   Deferred     Uterus - Consistent with 22 weeks, +FHTs       Pelvimetry - clinically adequate, gynecoid pelvis     Extremities:   Moves all extremities well, no edema, no cyanosis, no              redness   Pulses:   Pulses palpable and equal bilaterally   Skin:   No bleeding, bruising or  rash   Lymph nodes:   No palpable adenopathy   Neurologic:   Sensation intact, A&O times 3      Assessment    1) Pregnancy at 22w1d  2) Transfer of Care - Moved   Records in Epic and reviewed.   3) Diabetes in pregnancy - Pre-existing   On Metformin - but does not check BS   Check HgA1c and TSH, Start checking BS 4x per day and see if that is controlling at this time.   Return in 2 weeks to review.   4) Antibody screen with + antibody   Seeing hematology in Feb to see if needs further evaluation   Seeing Dr. Serrano of [facility] q 4 weeks right now   We need to make blood bank aware moving forward.     Also asked specifically about Ptyalism - Reviewed   Also discussed an interest in primary  (by Request)   - She reports concern with pain - discussed epidural for pain control in labor/ more post op pain after    She still seems interested, would continue discussions - no decisions yet.      Plan    Initial labs reviewed   Activity recommendation : 150 minutes/week of moderate intensity aerobic activity unless we limit for bleeding, hypertension or other pregnancy complication   Patient is on Prenatal vitamins  Problem list reviewed and updated.  Reviewed routine prenatal care with the office to include but not limited to   Zika (travel restrictions/ok to use insect repellant), not to changing cat litter, food restrictions, avoidance of alcohol, tobacco and drugs and saunas/hot tubs.   All questions answered.     Tor López MD   2/3/2020  1:16 PM

## 2020-02-04 LAB
HBA1C MFR BLD: 5.3 % (ref 4.8–5.6)
TSH SERPL DL<=0.005 MIU/L-ACNC: 0.64 UIU/ML (ref 0.45–4.5)

## 2020-02-05 ENCOUNTER — TELEPHONE (OUTPATIENT)
Dept: OBSTETRICS AND GYNECOLOGY | Facility: CLINIC | Age: 35
End: 2020-02-05

## 2020-02-05 NOTE — TELEPHONE ENCOUNTER
----- Message from Hui Brooks MA sent at 2/4/2020  3:53 PM EST -----  L/m for pt/donya  ----- Message -----  From: Tor López MD  Sent: 2/4/2020  12:17 PM EST  To: DOMITILA Gamez, let her know thyroid and HgA1c look good. Thanks, Dr. López

## 2020-02-05 NOTE — TELEPHONE ENCOUNTER
Hui Mar called with lab work. Her HgA1c was in a good range (indicating okay diabetic control) and her Thyroid (TSH) was normal. Can you let her know.     Thanks,   Dr. López    Patient states received a message yesterday and would like a call back please.

## 2020-02-06 ENCOUNTER — TELEPHONE (OUTPATIENT)
Dept: OBSTETRICS AND GYNECOLOGY | Facility: CLINIC | Age: 35
End: 2020-02-06

## 2020-02-06 DIAGNOSIS — R76.8 COOMBS POSITIVE: Primary | ICD-10-CM

## 2020-02-06 NOTE — TELEPHONE ENCOUNTER
----- Message from Tor López MD sent at 2/3/2020  2:01 PM EST -----  Constance, Need to get information to blood bank and [facility] and to review her antibody screen. Thanks, Dr. López

## 2020-02-06 NOTE — TELEPHONE ENCOUNTER
Constance,     Thanks!     Dr. López    Spoke with tech at [facility].  She is calling the original lab ([email or web address]) with questions regarding typing.  She will call back with further instructions.

## 2020-02-06 NOTE — TELEPHONE ENCOUNTER
Constance     I put order in for type and screen.   Can you put up a sticky note in her chart to remind me to send her closer to her due date.  Will have to review as may not know a few days prior to delivery.     Thanks   Dr. López    Per [facility] blood bank, pt had a +ZARIA test, typing was not done.  Recommending pt come to hospital 1-2 days prior to delivery for blood typing, as the typing is only good for 3 days.   Requesting an order for blood typing to be entered in Epic.

## 2020-02-07 ENCOUNTER — APPOINTMENT (OUTPATIENT)
Dept: DIABETES SERVICES | Facility: HOSPITAL | Age: 35
End: 2020-02-07

## 2020-02-07 ENCOUNTER — TELEPHONE (OUTPATIENT)
Dept: DIABETES SERVICES | Facility: HOSPITAL | Age: 35
End: 2020-02-07

## 2020-02-10 ENCOUNTER — HOSPITAL ENCOUNTER (OUTPATIENT)
Dept: DIABETES SERVICES | Facility: HOSPITAL | Age: 35
Discharge: HOME | End: 2020-02-10
Admitting: OBSTETRICS & GYNECOLOGY

## 2020-02-10 PROCEDURE — G0108 DIAB MANAGE TRN  PER INDIV: HCPCS

## 2020-02-19 ENCOUNTER — ROUTINE PRENATAL (OUTPATIENT)
Dept: OBSTETRICS AND GYNECOLOGY | Facility: CLINIC | Age: 35
End: 2020-02-19

## 2020-02-19 VITALS — SYSTOLIC BLOOD PRESSURE: 117 MMHG | DIASTOLIC BLOOD PRESSURE: 58 MMHG | BODY MASS INDEX: 25.56 KG/M2 | WEIGHT: 156 LBS

## 2020-02-19 DIAGNOSIS — O99.891 SYSTEMIC LUPUS ERYTHEMATOSUS AFFECTING PREGNANCY IN SECOND TRIMESTER (HCC): ICD-10-CM

## 2020-02-19 DIAGNOSIS — O09.92 HIGH-RISK PREGNANCY IN SECOND TRIMESTER: Primary | ICD-10-CM

## 2020-02-19 DIAGNOSIS — M32.9 SYSTEMIC LUPUS ERYTHEMATOSUS AFFECTING PREGNANCY IN SECOND TRIMESTER (HCC): ICD-10-CM

## 2020-02-19 DIAGNOSIS — O99.012 ANEMIA DURING PREGNANCY IN SECOND TRIMESTER: ICD-10-CM

## 2020-02-19 DIAGNOSIS — R76.8 POSITIVE COOMBS TEST: ICD-10-CM

## 2020-02-19 DIAGNOSIS — O24.112 PRE-EXISTING TYPE 2 DIABETES MELLITUS DURING PREGNANCY IN SECOND TRIMESTER: ICD-10-CM

## 2020-02-19 LAB
GLUCOSE UR STRIP-MCNC: NEGATIVE MG/DL
PROT UR STRIP-MCNC: ABNORMAL MG/DL

## 2020-02-19 PROCEDURE — 99214 OFFICE O/P EST MOD 30 MIN: CPT | Performed by: OBSTETRICS & GYNECOLOGY

## 2020-02-19 RX ORDER — BLOOD-GLUCOSE METER
KIT MISCELLANEOUS
COMMUNITY
Start: 2020-02-03 | End: 2020-04-01 | Stop reason: SDUPTHER

## 2020-02-19 RX ORDER — CHLORPHENIR/PHENYLEPH/ASPIRIN 2-7.8-325
TABLET, EFFERVESCENT ORAL
COMMUNITY
Start: 2020-02-04

## 2020-02-19 RX ORDER — METFORMIN HYDROCHLORIDE 500 MG/1
500 TABLET, EXTENDED RELEASE ORAL DAILY
Qty: 30 TABLET | Refills: 3 | Status: SHIPPED | OUTPATIENT
Start: 2020-02-19 | End: 2020-06-16

## 2020-02-19 RX ORDER — PNV,CALCIUM 72/IRON/FOLIC ACID 27 MG-1 MG
1 TABLET ORAL DAILY
Qty: 30 TABLET | Refills: 12 | Status: SHIPPED | OUTPATIENT
Start: 2020-02-19

## 2020-02-19 NOTE — PROGRESS NOTES
OB follow up     Elza Arcos is a 34 y.o.  24w3d being seen today for her obstetrical visit.  Patient reports no complaints.-please review labs from today in Care Everywhere. Fetal movement: normal.     Review of Systems  No bleeding, No cramping/contractions     /58   Wt 70.8 kg (156 lb)   LMP 2019   BMI 25.56 kg/m²     FHT: 144 BPM   Uterine Size: 24 cm       Assessment    1) pregnancy at 24w3d   2) Diabetes in pregnancy   On metformin   Brought BS log and has some breakfast > 120 but most are in range including fasting BS   Keep monitor - TSH and HgA1c were excellent.   3) Anemia in pregnancy   Appreciate note from Dr. Berman   For now continue prenatal, folic acid and iron   4) Possible SLE   Seen today by Rheumatology   Starting Plaquenil and undergoing further testing.   Has follow up soon with Dr. Serrano to review as well. 20 next appointment.   Will continue to monitor   5) + lou   Blood bank aware at [facility] and looking to get original testing   Encouraged if possible to do testing for cross match a few days prior to delivery     Given issues - consideration for  by request, may be better able to schedule delivery closer to term         Plan    Reviewed this stage of pregnancy  Problem list updated   Follow up in 2 weeks    Tor López MD   2020  2:55 PM

## 2020-03-04 ENCOUNTER — ROUTINE PRENATAL (OUTPATIENT)
Dept: OBSTETRICS AND GYNECOLOGY | Facility: CLINIC | Age: 35
End: 2020-03-04

## 2020-03-04 VITALS — BODY MASS INDEX: 25.4 KG/M2 | WEIGHT: 155 LBS | DIASTOLIC BLOOD PRESSURE: 65 MMHG | SYSTOLIC BLOOD PRESSURE: 125 MMHG

## 2020-03-04 DIAGNOSIS — Z3A.26 26 WEEKS GESTATION OF PREGNANCY: Primary | ICD-10-CM

## 2020-03-04 DIAGNOSIS — R76.8 POSITIVE COOMBS TEST: ICD-10-CM

## 2020-03-04 DIAGNOSIS — O24.112 PRE-EXISTING TYPE 2 DIABETES MELLITUS DURING PREGNANCY IN SECOND TRIMESTER: ICD-10-CM

## 2020-03-04 DIAGNOSIS — M32.9 SYSTEMIC LUPUS ERYTHEMATOSUS AFFECTING PREGNANCY IN SECOND TRIMESTER (HCC): ICD-10-CM

## 2020-03-04 DIAGNOSIS — O99.891 SYSTEMIC LUPUS ERYTHEMATOSUS AFFECTING PREGNANCY IN SECOND TRIMESTER (HCC): ICD-10-CM

## 2020-03-04 DIAGNOSIS — O99.012 ANEMIA DURING PREGNANCY IN SECOND TRIMESTER: ICD-10-CM

## 2020-03-04 DIAGNOSIS — O09.92 HIGH-RISK PREGNANCY IN SECOND TRIMESTER: ICD-10-CM

## 2020-03-04 LAB
GLUCOSE UR STRIP-MCNC: NEGATIVE MG/DL
PROT UR STRIP-MCNC: ABNORMAL MG/DL

## 2020-03-04 PROCEDURE — 99214 OFFICE O/P EST MOD 30 MIN: CPT | Performed by: OBSTETRICS & GYNECOLOGY

## 2020-03-04 RX ORDER — HYDROXYCHLOROQUINE SULFATE 200 MG/1
TABLET, FILM COATED ORAL
COMMUNITY
Start: 2020-02-19 | End: 2020-05-12

## 2020-03-04 RX ORDER — ONDANSETRON 4 MG/1
4 TABLET, FILM COATED ORAL EVERY 8 HOURS PRN
Qty: 30 TABLET | Refills: 3 | Status: SHIPPED | OUTPATIENT
Start: 2020-03-04 | End: 2020-04-21 | Stop reason: SDUPTHER

## 2020-03-04 NOTE — PROGRESS NOTES
OB follow up     Elza Arcos is a 34 y.o.  26w3d being seen today for her obstetrical visit.  Patient reports no complaints. Fetal movement: normal.    Her prenatal care is complicated by (and status): SLE - on plaquenil - Stable                                                                                   Diabetes - stable                                                                                   Anemia - stable                                                                                   + lou - stable     Review of Systems  Cramping/contractions : None   Vaginal bleeding: none   Fetal movement Good movement     /65   Wt 70.3 kg (155 lb)   LMP 2019   BMI 25.40 kg/m²     FHT: 154 BPM   Uterine Size: 26 cm       Assessment    1) pregnancy at 26w3d   Peds, prenatal classes and tours,   TDaP and flu recommended  Encouraged questions about L&D, anesthesia, breast feeding and birth control   2) Diabetes in pregnancy   On Metformin 500 mg at lunch   Did not bring BS log (highest is around 134 post lunch)   Otherwise typically in range.   Encouraged to bring log with visits to review,   Stable currently but with third trimester could require adjustment.   3) Anemia in pregnancy   Seeing Dr. Mary Ann Berman - Next appointment 3/20   On iron and folic acid - stable   4) SLE in pregnancy   Plaquenil currently   F/U Dr. Deandre Gan - Next appointment on 3/26  Asymptomatic currently   5) SGA -   Growth on 20 with Dr. Serrano (Reviewed), Overall at 13%, A/C at 8%   Normal dopplers seen   Repeating 3/25 another growth  Discussed concerns with SGA/IUGR   6) + lou   Blood bank at [facility] aware.   7) Nausea in pregnancy   Refilling zofran           Plan    Reviewed this stage of pregnancy  Problem list updated   Follow up in 2 weeks    Tor López MD   3/4/2020  9:46 AM

## 2020-03-24 ENCOUNTER — ROUTINE PRENATAL (OUTPATIENT)
Dept: OBSTETRICS AND GYNECOLOGY | Facility: CLINIC | Age: 35
End: 2020-03-24

## 2020-03-24 VITALS — BODY MASS INDEX: 25.73 KG/M2 | SYSTOLIC BLOOD PRESSURE: 124 MMHG | WEIGHT: 157 LBS | DIASTOLIC BLOOD PRESSURE: 69 MMHG

## 2020-03-24 DIAGNOSIS — O09.93 HIGH-RISK PREGNANCY IN THIRD TRIMESTER: Primary | ICD-10-CM

## 2020-03-24 DIAGNOSIS — M32.9 SYSTEMIC LUPUS ERYTHEMATOSUS AFFECTING PREGNANCY IN THIRD TRIMESTER (HCC): ICD-10-CM

## 2020-03-24 DIAGNOSIS — R76.8 POSITIVE COOMBS TEST: ICD-10-CM

## 2020-03-24 DIAGNOSIS — O99.013 ANEMIA DURING PREGNANCY IN THIRD TRIMESTER: ICD-10-CM

## 2020-03-24 DIAGNOSIS — O24.113 PRE-EXISTING TYPE 2 DIABETES MELLITUS DURING PREGNANCY IN THIRD TRIMESTER: ICD-10-CM

## 2020-03-24 DIAGNOSIS — O99.891 SYSTEMIC LUPUS ERYTHEMATOSUS AFFECTING PREGNANCY IN THIRD TRIMESTER (HCC): ICD-10-CM

## 2020-03-24 LAB
GLUCOSE UR STRIP-MCNC: NEGATIVE MG/DL
PROT UR STRIP-MCNC: ABNORMAL MG/DL

## 2020-03-24 PROCEDURE — 99214 OFFICE O/P EST MOD 30 MIN: CPT | Performed by: OBSTETRICS & GYNECOLOGY

## 2020-03-24 NOTE — PROGRESS NOTES
OB follow up     Elza Arcos is a 34 y.o.  29w2d being seen today for her obstetrical visit.  Patient reports no complaints. Fetal movement: normal.    Her prenatal care is complicated by (and status): SLE, Diabetes, SGA and anemia/Angie + - all stable     Review of Systems  Cramping/contractions : None   Vaginal bleeding: None   Fetal movement Good     /69   Wt 71.2 kg (157 lb)   LMP 2019   BMI 25.73 kg/m²     FHT: 156 BPM   Uterine Size: 28 cm       Assessment    1) pregnancy at 29w2d   Rh+,   2) DM in pregnancy   On Metformin.   BS log here since 3/5 - Only few elevated (most in range)   3) Anemia   Seeing Dr. Mary Ann Berman   Appointment moved until April   4) SLE in pregnancy   On Plaquenil   Following up this week with Dr. Gan (3/26)   5) SGA   Growth with Dr. Serrano tomorrow on 3/25   Follow from there   6) + Angie   Blood bank aware.     Asking about timing of delivery   Not sure given all her issues, but would suspect we will consider 37 or 38 weeks if IUGR or issues with SLE.         Plan    Reviewed this stage of pregnancy  Problem list updated   Follow up in 2 weeks    Tor López MD   3/24/2020  13:32

## 2020-04-01 ENCOUNTER — TELEPHONE (OUTPATIENT)
Dept: OBSTETRICS AND GYNECOLOGY | Facility: CLINIC | Age: 35
End: 2020-04-01

## 2020-04-01 RX ORDER — BLOOD-GLUCOSE METER
KIT MISCELLANEOUS
Qty: 1 EACH | Refills: 1 | Status: SHIPPED | OUTPATIENT
Start: 2020-04-01

## 2020-04-01 NOTE — TELEPHONE ENCOUNTER
Keyla,     Request for freestyle lite glucometer sent to pharmacy   Please let her know.     Thanks  Dr. López

## 2020-04-07 ENCOUNTER — ROUTINE PRENATAL (OUTPATIENT)
Dept: OBSTETRICS AND GYNECOLOGY | Facility: CLINIC | Age: 35
End: 2020-04-07

## 2020-04-07 VITALS — DIASTOLIC BLOOD PRESSURE: 60 MMHG | SYSTOLIC BLOOD PRESSURE: 110 MMHG | BODY MASS INDEX: 25.56 KG/M2 | WEIGHT: 156 LBS

## 2020-04-07 DIAGNOSIS — O99.013 ANEMIA DURING PREGNANCY IN THIRD TRIMESTER: ICD-10-CM

## 2020-04-07 DIAGNOSIS — O09.93 HIGH-RISK PREGNANCY IN THIRD TRIMESTER: Primary | ICD-10-CM

## 2020-04-07 DIAGNOSIS — M32.9 SYSTEMIC LUPUS ERYTHEMATOSUS AFFECTING PREGNANCY IN THIRD TRIMESTER (HCC): ICD-10-CM

## 2020-04-07 DIAGNOSIS — O99.891 SYSTEMIC LUPUS ERYTHEMATOSUS AFFECTING PREGNANCY IN THIRD TRIMESTER (HCC): ICD-10-CM

## 2020-04-07 DIAGNOSIS — O24.113 PRE-EXISTING TYPE 2 DIABETES MELLITUS DURING PREGNANCY IN THIRD TRIMESTER: ICD-10-CM

## 2020-04-07 DIAGNOSIS — R76.8 POSITIVE COOMBS TEST: ICD-10-CM

## 2020-04-07 LAB
GLUCOSE UR STRIP-MCNC: NEGATIVE MG/DL
PROT UR STRIP-MCNC: ABNORMAL MG/DL

## 2020-04-07 PROCEDURE — 99214 OFFICE O/P EST MOD 30 MIN: CPT | Performed by: OBSTETRICS & GYNECOLOGY

## 2020-04-07 NOTE — PROGRESS NOTES
OB follow up     Elza Arcos is a 34 y.o.  31w2d being seen today for her obstetrical visit.  Patient reports fatigue. Fetal movement: normal.    Her prenatal care is complicated by (and status): 1) DM, type II - on metformin, stable 2) Anemia - Hg recently 9.6 gms - tolerable 3) SLE in pregnancy - On Plaquenil - stable per her rheumatologist  4) SGA - recent growth with Dr. Serrano and 5) + Angie - stable see prior notes.     Review of Systems  Cramping/contractions : None   Vaginal bleeding: none   Fetal movement good     /60   Wt 70.8 kg (156 lb)   LMP 2019   BMI 25.56 kg/m²     FHT: 146 BPM   Uterine Size: 28 cm       Assessment    1) pregnancy at 31w2d   2) DM on metformin (history of Type II)   Still not log for review  Stressed to bring with her   Reports good BS control   3) Anemia   Seeing hematology, Dr. Berman -   Last check Hg 9.6 grams (reasonable)   4) SLE in pregnancy   Reviewed note of recent visit.   Appears they want echo post delivery?  5) SGA - Growth 14%, A/C 10% with SARA of 8 cm - Overall + growth but slowing   Per Dr. Serrano repeating at 3 weeks   Per patient - appointment is next week   6) Elevated LFTs   Borderline prior visit,   No proteinuria or HTN to suggest HELLP  Normal platelets   7) Decreased Sara   Per Dr. Serrano, plan to reassess at 3 weeks from last scan   8) + Angie   Plan to see if can get sample to blood bank prior to needing delivery       Plan    Reviewed this stage of pregnancy  Problem list updated   Follow up in 2 weeks    Tor López MD   2020  13:47

## 2020-04-09 ENCOUNTER — ROUTINE PRENATAL (OUTPATIENT)
Dept: OBSTETRICS AND GYNECOLOGY | Facility: CLINIC | Age: 35
End: 2020-04-09

## 2020-04-09 VITALS — SYSTOLIC BLOOD PRESSURE: 131 MMHG | BODY MASS INDEX: 25.73 KG/M2 | WEIGHT: 157 LBS | DIASTOLIC BLOOD PRESSURE: 80 MMHG

## 2020-04-09 DIAGNOSIS — M32.9 SYSTEMIC LUPUS ERYTHEMATOSUS AFFECTING PREGNANCY IN THIRD TRIMESTER (HCC): ICD-10-CM

## 2020-04-09 DIAGNOSIS — O99.013 ANEMIA DURING PREGNANCY IN THIRD TRIMESTER: ICD-10-CM

## 2020-04-09 DIAGNOSIS — R76.8 POSITIVE COOMBS TEST: ICD-10-CM

## 2020-04-09 DIAGNOSIS — O99.891 SYSTEMIC LUPUS ERYTHEMATOSUS AFFECTING PREGNANCY IN THIRD TRIMESTER (HCC): ICD-10-CM

## 2020-04-09 DIAGNOSIS — O09.93 HIGH-RISK PREGNANCY IN THIRD TRIMESTER: Primary | ICD-10-CM

## 2020-04-09 DIAGNOSIS — O47.03 PRETERM UTERINE CONTRACTIONS IN THIRD TRIMESTER, ANTEPARTUM: ICD-10-CM

## 2020-04-09 DIAGNOSIS — O24.113 PRE-EXISTING TYPE 2 DIABETES MELLITUS DURING PREGNANCY IN THIRD TRIMESTER: ICD-10-CM

## 2020-04-09 LAB
GLUCOSE UR STRIP-MCNC: NEGATIVE MG/DL
PROT UR STRIP-MCNC: ABNORMAL MG/DL

## 2020-04-09 PROCEDURE — 99214 OFFICE O/P EST MOD 30 MIN: CPT | Performed by: OBSTETRICS & GYNECOLOGY

## 2020-04-09 NOTE — PROGRESS NOTES
OB follow up     Elza Arcos is a 34 y.o.  31w4d being seen today for her obstetrical visit.  Patient reports contractions/cramping and pressure. Pt started feeling thsi way 2 days ago. Pt had a miscarraige last year and feels the same pain as she did when this happened. Pt reports constipation, but is having BM's daily. . Fetal movement: normal.    Having contractions yesterday   7 contractions yesterday in 20 minutes - Happened between 1-2 AM   Still with intermittent contractions   Pressure with the contractions   Contractions last 15-25 seconds   Her prenatal care is complicated by (and status): 1) DM, type II on metformin, 2) anemia - stable 3) SLE in pregnancy, 4) SGA and 5) + lou test     Review of Systems  Cramping/contractions : increasing/uncomfortable.   Vaginal bleeding: None   Fetal movement Good     /80   Wt 71.2 kg (157 lb)   LMP 2019   BMI 25.73 kg/m²     FHT: 134 BPM   Uterine Size: 28 cm       Assessment    1) pregnancy at 31w4d   2)  contractions   Cx closed, 25%, and firm   Check FFN   Pelvic rest and monitor contractions   PTL warnings reviewed to go to OB ED   3) DM, type II  - reviewed recently in office  Stable   4) Anemia - on iron   Reviewed recently in office, stable  5) SLE   On medication   Reviewed recently in office stable   6) SGA/+lou   See prior discussions       Plan    Reviewed this stage of pregnancy  Problem list updated   Follow up in 2 weeks    Tor López MD   2020  13:04

## 2020-04-10 LAB — FIBRONECTIN FETAL VAG QL: POSITIVE

## 2020-04-21 ENCOUNTER — TELEPHONE (OUTPATIENT)
Dept: OBSTETRICS AND GYNECOLOGY | Facility: CLINIC | Age: 35
End: 2020-04-21

## 2020-04-21 ENCOUNTER — PROCEDURE VISIT (OUTPATIENT)
Dept: OBSTETRICS AND GYNECOLOGY | Facility: CLINIC | Age: 35
End: 2020-04-21

## 2020-04-21 ENCOUNTER — ROUTINE PRENATAL (OUTPATIENT)
Dept: OBSTETRICS AND GYNECOLOGY | Facility: CLINIC | Age: 35
End: 2020-04-21

## 2020-04-21 VITALS — SYSTOLIC BLOOD PRESSURE: 117 MMHG | DIASTOLIC BLOOD PRESSURE: 70 MMHG | WEIGHT: 156 LBS | BODY MASS INDEX: 25.56 KG/M2

## 2020-04-21 DIAGNOSIS — O24.113 PRE-EXISTING TYPE 2 DIABETES MELLITUS DURING PREGNANCY IN THIRD TRIMESTER: Primary | ICD-10-CM

## 2020-04-21 DIAGNOSIS — O99.013 ANEMIA DURING PREGNANCY IN THIRD TRIMESTER: ICD-10-CM

## 2020-04-21 DIAGNOSIS — O99.891 SYSTEMIC LUPUS ERYTHEMATOSUS AFFECTING PREGNANCY IN THIRD TRIMESTER (HCC): ICD-10-CM

## 2020-04-21 DIAGNOSIS — M32.9 SYSTEMIC LUPUS ERYTHEMATOSUS AFFECTING PREGNANCY IN THIRD TRIMESTER (HCC): ICD-10-CM

## 2020-04-21 DIAGNOSIS — O24.113 PRE-EXISTING TYPE 2 DIABETES MELLITUS DURING PREGNANCY IN THIRD TRIMESTER: ICD-10-CM

## 2020-04-21 DIAGNOSIS — R76.8 POSITIVE COOMBS TEST: ICD-10-CM

## 2020-04-21 DIAGNOSIS — R79.89 ELEVATED LFTS: ICD-10-CM

## 2020-04-21 DIAGNOSIS — O09.93 HIGH-RISK PREGNANCY IN THIRD TRIMESTER: Primary | ICD-10-CM

## 2020-04-21 LAB
GLUCOSE UR STRIP-MCNC: NEGATIVE MG/DL
PROT UR STRIP-MCNC: ABNORMAL MG/DL

## 2020-04-21 PROCEDURE — 99214 OFFICE O/P EST MOD 30 MIN: CPT | Performed by: OBSTETRICS & GYNECOLOGY

## 2020-04-21 PROCEDURE — 76819 FETAL BIOPHYS PROFIL W/O NST: CPT | Performed by: OBSTETRICS & GYNECOLOGY

## 2020-04-21 RX ORDER — ONDANSETRON 4 MG/1
4 TABLET, FILM COATED ORAL EVERY 8 HOURS PRN
Qty: 30 TABLET | Refills: 3 | Status: SHIPPED | OUTPATIENT
Start: 2020-04-21 | End: 2020-05-12

## 2020-04-21 NOTE — PROGRESS NOTES
OB follow up     Elza Arcos is a 34 y.o.  33w2d being seen today for her obstetrical visit.  Patient reports cramping at times. . Fetal movement: normal.    Her prenatal care is complicated by (and status): DM (hx of type II on metformin), Anemia, SLE and SGA     Review of Systems  Cramping/contractions : intermittent   Vaginal bleeding: none   Fetal movement good     /70   Wt 70.8 kg (156 lb)   LMP 2019   BMI 25.56 kg/m²     FHT: 156 BPM   Uterine Size: 27 cm       Assessment    1) pregnancy at 33w2d   2) Diabetes, type II   Metformin currently once a day at lunch   Fasting  - most at 90, 92   Breakfast, 100-140, most less or equal to 120   Lunch 100-130, most under 120   Dinner , All in good range   Last HgA1c was 5.3 in early Feb   3) Anemia   Working with Dr. Berman   4) SLE   On plaquenil   Recent testing was okay   5) SGA - Growth done with MFM on 4/15/20   19% overall, A/C 21%, normal teddy and BPP   Recommended weekly BPP (with us)   6) Elevated LFTs  Still good BP, recheck   7) + lou   See prior notes     Plan    Reviewed this stage of pregnancy  Problem list updated   Follow up in 1 weeks    Tor López MD   2020  14:00

## 2020-04-21 NOTE — TELEPHONE ENCOUNTER
----- Message from Brit Shah sent at 4/21/2020  8:40 AM EDT -----  REQUESTING REFILL ON ONDANSETRON

## 2020-04-21 NOTE — TELEPHONE ENCOUNTER
Constance,     I sent in the refill on zofran requested    Please let her and pharmacy know.     Thanks   Dr. López

## 2020-04-22 ENCOUNTER — TELEPHONE (OUTPATIENT)
Dept: OBSTETRICS AND GYNECOLOGY | Facility: CLINIC | Age: 35
End: 2020-04-22

## 2020-04-22 LAB
ALBUMIN SERPL-MCNC: 3.6 G/DL (ref 3.5–5.2)
ALBUMIN/GLOB SERPL: 0.8 G/DL
ALP SERPL-CCNC: 97 U/L (ref 39–117)
ALT SERPL-CCNC: 46 U/L (ref 1–33)
AST SERPL-CCNC: 33 U/L (ref 1–32)
BASOPHILS # BLD AUTO: 0.01 10*3/MM3 (ref 0–0.2)
BASOPHILS NFR BLD AUTO: 0.2 % (ref 0–1.5)
BILIRUB SERPL-MCNC: 0.2 MG/DL (ref 0.2–1.2)
BUN SERPL-MCNC: 8 MG/DL (ref 6–20)
BUN/CREAT SERPL: 13.6 (ref 7–25)
CALCIUM SERPL-MCNC: 9.6 MG/DL (ref 8.6–10.5)
CHLORIDE SERPL-SCNC: 101 MMOL/L (ref 98–107)
CO2 SERPL-SCNC: 21.7 MMOL/L (ref 22–29)
CREAT SERPL-MCNC: 0.59 MG/DL (ref 0.57–1)
EOSINOPHIL # BLD AUTO: 0.02 10*3/MM3 (ref 0–0.4)
EOSINOPHIL NFR BLD AUTO: 0.4 % (ref 0.3–6.2)
ERYTHROCYTE [DISTWIDTH] IN BLOOD BY AUTOMATED COUNT: 14.3 % (ref 12.3–15.4)
GLOBULIN SER CALC-MCNC: 4.5 GM/DL
GLUCOSE SERPL-MCNC: 94 MG/DL (ref 65–99)
HBA1C MFR BLD: 5.6 % (ref 4.8–5.6)
HCT VFR BLD AUTO: 30.7 % (ref 34–46.6)
HGB BLD-MCNC: 10.3 G/DL (ref 12–15.9)
IMM GRANULOCYTES # BLD AUTO: 0.01 10*3/MM3 (ref 0–0.05)
IMM GRANULOCYTES NFR BLD AUTO: 0.2 % (ref 0–0.5)
LYMPHOCYTES # BLD AUTO: 1.26 10*3/MM3 (ref 0.7–3.1)
LYMPHOCYTES NFR BLD AUTO: 27.5 % (ref 19.6–45.3)
MCH RBC QN AUTO: 27.2 PG (ref 26.6–33)
MCHC RBC AUTO-ENTMCNC: 33.6 G/DL (ref 31.5–35.7)
MCV RBC AUTO: 81.2 FL (ref 79–97)
MONOCYTES # BLD AUTO: 0.64 10*3/MM3 (ref 0.1–0.9)
MONOCYTES NFR BLD AUTO: 13.9 % (ref 5–12)
NEUTROPHILS # BLD AUTO: 2.65 10*3/MM3 (ref 1.7–7)
NEUTROPHILS NFR BLD AUTO: 57.8 % (ref 42.7–76)
NRBC BLD AUTO-RTO: 0.2 /100 WBC (ref 0–0.2)
PLATELET # BLD AUTO: 263 10*3/MM3 (ref 140–450)
POTASSIUM SERPL-SCNC: 4.4 MMOL/L (ref 3.5–5.2)
PROT SERPL-MCNC: 8.1 G/DL (ref 6–8.5)
RBC # BLD AUTO: 3.78 10*6/MM3 (ref 3.77–5.28)
SODIUM SERPL-SCNC: 134 MMOL/L (ref 136–145)
WBC # BLD AUTO: 4.59 10*3/MM3 (ref 3.4–10.8)

## 2020-04-22 NOTE — TELEPHONE ENCOUNTER
----- Message from Tor López MD sent at 4/22/2020  9:35 AM EDT -----  Hui,  Overall blood work looks good. HgA1c is 5.6% - Which is excellent. Her Hg is 10.3 grams also excellent. Her LFTs are stable just above normal. Please let her know. Thanks, Dr. López

## 2020-04-28 ENCOUNTER — ROUTINE PRENATAL (OUTPATIENT)
Dept: OBSTETRICS AND GYNECOLOGY | Facility: CLINIC | Age: 35
End: 2020-04-28

## 2020-04-28 ENCOUNTER — PROCEDURE VISIT (OUTPATIENT)
Dept: OBSTETRICS AND GYNECOLOGY | Facility: CLINIC | Age: 35
End: 2020-04-28

## 2020-04-28 VITALS — DIASTOLIC BLOOD PRESSURE: 62 MMHG | WEIGHT: 157 LBS | BODY MASS INDEX: 25.73 KG/M2 | SYSTOLIC BLOOD PRESSURE: 122 MMHG

## 2020-04-28 DIAGNOSIS — O99.891 SYSTEMIC LUPUS ERYTHEMATOSUS AFFECTING PREGNANCY IN THIRD TRIMESTER (HCC): ICD-10-CM

## 2020-04-28 DIAGNOSIS — O99.013 ANEMIA DURING PREGNANCY IN THIRD TRIMESTER: ICD-10-CM

## 2020-04-28 DIAGNOSIS — R79.89 ELEVATED LFTS: ICD-10-CM

## 2020-04-28 DIAGNOSIS — R76.8 POSITIVE COOMBS TEST: ICD-10-CM

## 2020-04-28 DIAGNOSIS — M32.9 SYSTEMIC LUPUS ERYTHEMATOSUS AFFECTING PREGNANCY IN THIRD TRIMESTER (HCC): ICD-10-CM

## 2020-04-28 DIAGNOSIS — O28.8 AFI (AMNIOTIC FLUID INDEX) DECREASED: ICD-10-CM

## 2020-04-28 DIAGNOSIS — O09.93 HIGH-RISK PREGNANCY IN THIRD TRIMESTER: Primary | ICD-10-CM

## 2020-04-28 DIAGNOSIS — O24.113 PRE-EXISTING TYPE 2 DIABETES MELLITUS DURING PREGNANCY IN THIRD TRIMESTER: ICD-10-CM

## 2020-04-28 DIAGNOSIS — O24.113 PRE-EXISTING TYPE 2 DIABETES MELLITUS DURING PREGNANCY IN THIRD TRIMESTER: Primary | ICD-10-CM

## 2020-04-28 LAB
GLUCOSE UR STRIP-MCNC: NEGATIVE MG/DL
PROT UR STRIP-MCNC: NEGATIVE MG/DL

## 2020-04-28 PROCEDURE — 76819 FETAL BIOPHYS PROFIL W/O NST: CPT | Performed by: OBSTETRICS & GYNECOLOGY

## 2020-04-28 PROCEDURE — 99214 OFFICE O/P EST MOD 30 MIN: CPT | Performed by: OBSTETRICS & GYNECOLOGY

## 2020-04-28 NOTE — PROGRESS NOTES
OB follow up     Elza Arcos is a 34 y.o.  34w2d being seen today for her obstetrical visit.  Patient reports no complaints. Fetal movement: normal.    Her prenatal care is complicated by (and status): Type II DM, SLE, SGA, Decreased SARA     Review of Systems  Cramping/contractions : None   Vaginal bleeding: none   Fetal movement good     /62   Wt 71.2 kg (157 lb)   LMP 2019   BMI 25.73 kg/m²     FHT: 144 BPM   Uterine Size: 30 cm       Assessment    1) pregnancy at 34w2d   2) Diabetes, Type II   Metformin currently at lunch   Fasting 2 or 7 elevated hightest 100  Breakfast - all normal   LUnch only 2 of 7 abnormal   Dinner all normal   HgA1c last week was 5.6% - so good overall.   3) Anemia   Seeing Dr. Berman   Hg on  was 10. 3 grams, which is fair   4) SLE   On plaquenil   Rheumatology and Dr. Serrano following   Not sure if will move induction up to 37 or 38 weeks?  5) SGA  Growth with Dr. Serrano on 15  BPP  but still decreased SARA at 9.63 today   Growth scheduled with Dr. Serrano on    6) Elevated LFTs  Recheck last week   Just above normal - stable overall   7) + lou  See prior notes.     Continue with weekly BPP and visit for now.   Consider timing of delivery with Dr. Serrano         Plan    Reviewed this stage of pregnancy  Problem list updated   Follow up in 1 weeks    Tor López MD   2020  12:24

## 2020-05-05 ENCOUNTER — ROUTINE PRENATAL (OUTPATIENT)
Dept: OBSTETRICS AND GYNECOLOGY | Facility: CLINIC | Age: 35
End: 2020-05-05

## 2020-05-05 VITALS — DIASTOLIC BLOOD PRESSURE: 67 MMHG | WEIGHT: 160 LBS | SYSTOLIC BLOOD PRESSURE: 109 MMHG | BODY MASS INDEX: 26.22 KG/M2

## 2020-05-05 DIAGNOSIS — O09.93 HIGH-RISK PREGNANCY IN THIRD TRIMESTER: Primary | ICD-10-CM

## 2020-05-05 DIAGNOSIS — O24.113 PRE-EXISTING TYPE 2 DIABETES MELLITUS DURING PREGNANCY IN THIRD TRIMESTER: ICD-10-CM

## 2020-05-05 DIAGNOSIS — R76.8 POSITIVE COOMBS TEST: ICD-10-CM

## 2020-05-05 DIAGNOSIS — Z36.9 ANTENATAL SCREENING ENCOUNTER: ICD-10-CM

## 2020-05-05 DIAGNOSIS — O99.891 SYSTEMIC LUPUS ERYTHEMATOSUS AFFECTING PREGNANCY IN THIRD TRIMESTER (HCC): ICD-10-CM

## 2020-05-05 DIAGNOSIS — R79.89 ELEVATED LFTS: ICD-10-CM

## 2020-05-05 DIAGNOSIS — O99.013 ANEMIA DURING PREGNANCY IN THIRD TRIMESTER: ICD-10-CM

## 2020-05-05 DIAGNOSIS — M32.9 SYSTEMIC LUPUS ERYTHEMATOSUS AFFECTING PREGNANCY IN THIRD TRIMESTER (HCC): ICD-10-CM

## 2020-05-05 LAB
GLUCOSE UR STRIP-MCNC: NEGATIVE MG/DL
PROT UR STRIP-MCNC: NEGATIVE MG/DL

## 2020-05-05 PROCEDURE — 99214 OFFICE O/P EST MOD 30 MIN: CPT | Performed by: OBSTETRICS & GYNECOLOGY

## 2020-05-05 NOTE — PROGRESS NOTES
Ob follow up    Elza Arcos is a 34 y.o.  35w2d patient being seen today for her obstetrical visit. Patient reports no complaints. Fetal movement: normal.    Her prenatal care is complicated by (and status) : Type II DM, Anemia, SLE, SGA, LFTs, +Lou       ROS -   Fetal Movement Good   Vaginal bleeding None   Cramping/Contractions Rare      /67   Wt 72.6 kg (160 lb)   LMP 2019   BMI 26.22 kg/m²     FHT:  133 BPM    Uterine Size: 32 cm   Presentations: cephalic   Pelvic Exam:     Dilation: 1cm    Effacement: 25%    Station:  -2                 Assessment    1) Pregnancy at 35w2d  2) Fetal status reassuring   3) GBS status - done today  4) Type II DM on Metformin   Most BS in range, One or two elevated.   HgA1c recently 5.6% so overall good control   5) Anemia  Hg up to 10.3 grams   6) SLE  On plaquenil   Rheumatology and Dr. Serrano following   No notes about timing of delivery   7) SGA  Interval growth appropriate   BPP, dopplers and growth good yesterday   Dr. Serrano to repeat BPP and doppler next Monday and then growth the following MOnday   8) Elevated LFTs  Just above baseline   Recheck around delivery   9) + lou  Consider labs day before delivery   10) Delivery route  Desires  over vaginal   No direct indication but several concerns with how well infant will tolerate labor.   Discuss once we have better idea of timing of delivery     Reviewed changes based on COVID-19 on L&D, specifically Stop work by 37 weeks, just patient to OB ED for evaluation - can have one support person in labor and Coronavirus testing for active labor patients.        Plan    Labor warnings   [facility] [place] B[facility]        Tor López MD   2020  13:41

## 2020-05-07 LAB — GP B STREP DNA SPEC QL NAA+PROBE: POSITIVE

## 2020-05-08 PROBLEM — O99.820 GBS (GROUP B STREPTOCOCCUS CARRIER), +RV CULTURE, CURRENTLY PREGNANT: Status: ACTIVE | Noted: 2020-05-08

## 2020-05-12 ENCOUNTER — ROUTINE PRENATAL (OUTPATIENT)
Dept: OBSTETRICS AND GYNECOLOGY | Facility: CLINIC | Age: 35
End: 2020-05-12

## 2020-05-12 VITALS — DIASTOLIC BLOOD PRESSURE: 76 MMHG | WEIGHT: 159 LBS | BODY MASS INDEX: 26.06 KG/M2 | SYSTOLIC BLOOD PRESSURE: 129 MMHG

## 2020-05-12 DIAGNOSIS — O99.820 GBS (GROUP B STREPTOCOCCUS CARRIER), +RV CULTURE, CURRENTLY PREGNANT: ICD-10-CM

## 2020-05-12 DIAGNOSIS — R76.8 POSITIVE COOMBS TEST: ICD-10-CM

## 2020-05-12 DIAGNOSIS — O99.891 SYSTEMIC LUPUS ERYTHEMATOSUS AFFECTING PREGNANCY IN THIRD TRIMESTER (HCC): ICD-10-CM

## 2020-05-12 DIAGNOSIS — O99.013 ANEMIA DURING PREGNANCY IN THIRD TRIMESTER: ICD-10-CM

## 2020-05-12 DIAGNOSIS — R79.89 ELEVATED LFTS: ICD-10-CM

## 2020-05-12 DIAGNOSIS — M32.9 SYSTEMIC LUPUS ERYTHEMATOSUS AFFECTING PREGNANCY IN THIRD TRIMESTER (HCC): ICD-10-CM

## 2020-05-12 DIAGNOSIS — O24.113 PRE-EXISTING TYPE 2 DIABETES MELLITUS DURING PREGNANCY IN THIRD TRIMESTER: ICD-10-CM

## 2020-05-12 DIAGNOSIS — O09.93 HIGH-RISK PREGNANCY IN THIRD TRIMESTER: Primary | ICD-10-CM

## 2020-05-12 LAB
GLUCOSE UR STRIP-MCNC: NEGATIVE MG/DL
PROT UR STRIP-MCNC: ABNORMAL MG/DL

## 2020-05-12 PROCEDURE — 99214 OFFICE O/P EST MOD 30 MIN: CPT | Performed by: OBSTETRICS & GYNECOLOGY

## 2020-05-12 RX ORDER — HYDROXYCHLOROQUINE SULFATE 200 MG/1
TABLET, FILM COATED ORAL
COMMUNITY
Start: 2020-02-19

## 2020-05-12 RX ORDER — ONDANSETRON 4 MG/1
4 TABLET, FILM COATED ORAL
COMMUNITY
Start: 2020-04-21 | End: 2020-06-16

## 2020-05-12 NOTE — PROGRESS NOTES
Ob follow up    Elza Arcos is a 34 y.o.  36w2d patient being seen today for her obstetrical visit. Patient reports cramping, elevated B/P reading yesterday-134/76, denies any blurred vision or h/a. . Fetal movement: decreased as of today.     Her prenatal care is complicated by (and status) : Type II DM, Anemia, SLE, SGA, elevated LFTs, + lou       ROS -   Fetal Movement Sufficient   Vaginal bleeding none   Cramping/Contractions intermittent      /76   Wt 72.1 kg (159 lb)   LMP 2019   BMI 26.06 kg/m²     FHT:  134 BPM    Uterine Size: 33 cm   Presentations: cephalic   Pelvic Exam:     Dilation: 1cm    Effacement: 25%    Station:  -2                 Assessment    1) Pregnancy at 36w2d  2) Fetal status reassuring   3) GBS status - Positive  4) Type II DM   Well controlled on Metformin   Prior BS log essentially normal.   Most recent HgA1c was 5.6%, so good control overall.   Did not have log today   5) Anemia   Hg stable on iron   6) SLE  On plaquenil   Rheumatology and Dr. Serrano following   BPP yesterday with dopplers , cephalic and not elevated.   Plan growth with BPP/Doppler next week with Dr. Serrano.   7) SGA   See above   Repeating growth next week   8) Elevated LFTs  Stable over several weeks.   9) + lou  Need to consider pre testing for T&X prior to delivery   10) Desires elective    If take to 39 weeks - will be 20 ?    Noted movement less than typical this morning, however she did feel several movements in office with me.  Discussed [facility] BID in detail (normal BPP yesterday)     Plan    Labor warnings   [facility] BID        Tor López MD   2020  10:30

## 2020-05-13 ENCOUNTER — TELEPHONE (OUTPATIENT)
Dept: OBSTETRICS AND GYNECOLOGY | Facility: CLINIC | Age: 35
End: 2020-05-13

## 2020-05-13 ENCOUNTER — ROUTINE PRENATAL (OUTPATIENT)
Dept: OBSTETRICS AND GYNECOLOGY | Facility: CLINIC | Age: 35
End: 2020-05-13

## 2020-05-13 VITALS — BODY MASS INDEX: 26.06 KG/M2 | SYSTOLIC BLOOD PRESSURE: 118 MMHG | DIASTOLIC BLOOD PRESSURE: 63 MMHG | WEIGHT: 159 LBS

## 2020-05-13 DIAGNOSIS — O09.93 HIGH-RISK PREGNANCY IN THIRD TRIMESTER: Primary | ICD-10-CM

## 2020-05-13 DIAGNOSIS — O24.113 PRE-EXISTING TYPE 2 DIABETES MELLITUS DURING PREGNANCY IN THIRD TRIMESTER: ICD-10-CM

## 2020-05-13 DIAGNOSIS — O47.03 FALSE LABOR BEFORE 37 COMPLETED WEEKS OF GESTATION DURING PREGNANCY IN THIRD TRIMESTER, ANTEPARTUM: ICD-10-CM

## 2020-05-13 PROCEDURE — 99214 OFFICE O/P EST MOD 30 MIN: CPT | Performed by: OBSTETRICS & GYNECOLOGY

## 2020-05-13 NOTE — PROGRESS NOTES
Ob follow up    Elza Arcos is a 34 y.o.  36w3d patient being seen today for her obstetrical visit. Patient reports possible leaking of fluid. Pt woke up this am to go to the restroom, and her underwear were wet. . Fetal movement: normal.    Her prenatal care is complicated by (and status) : Multiple issues, see yesterday note.   Special visit due to concern for PPROM (amniorrhexis this morning)     Early morning with wet underwear.   Bedding not affected  Has not continued to leak.         ROS -   Fetal Movement Good   Vaginal bleeding none   Cramping/Contractions Intermittent      /63   Wt 72.1 kg (159 lb)   LMP 2019   BMI 26.06 kg/m²     FHT:  144 BPM    Uterine Size: 33 cm   Presentations: cephalic   Pelvic Exam:     Dilation: deferred    Effacement: deferred    Station:  deferred                 Assessment    1) Pregnancy at 36w3d  2) Fetal status reassuring   3) GBS status - Positive  4) Multiple issues, Type II DM, SLE, anemia, + Angie  All reviewed yesterday and stable.   5) New concern for amniorrhexis  Pool negative, Nitrazine negative and history not consistent.   So false labor in the end. Reassured    Plan    Labor warnings   [facility] B[facility]        Tor López MD   2020  10:34

## 2020-05-13 NOTE — TELEPHONE ENCOUNTER
36w 3d ob pt called to report her panties were wet when she woke up this morning.  There is no color or odor, and her bed was not wet.  Pt is concerned, please advise.     Pt # [phone]

## 2020-05-13 NOTE — TELEPHONE ENCOUNTER
Constance,     Go ahead and have her come in for a check of rupture in office today. I do not suspect rupture as typically the fluid continues to come out, but only way to check is with office visit.     Thanks  Dr. López

## 2020-05-15 ENCOUNTER — TELEPHONE (OUTPATIENT)
Dept: OBSTETRICS AND GYNECOLOGY | Facility: CLINIC | Age: 35
End: 2020-05-15

## 2020-05-15 NOTE — TELEPHONE ENCOUNTER
36wk 5d ob pt called to report her blood sugar was 155 this morning and was high yesterday as well. States she is not feeling well because of this.  Pt is concerned, please advise.     Pt # [phone]

## 2020-05-15 NOTE — TELEPHONE ENCOUNTER
Constance,     I told her to do the Metformin 500 mg BID (taking dose between dinner and bedtime to see if that brings down her morning BS as well as watching breakfast diet.)   She is going to try that    Thanks   Dr. López

## 2020-05-19 ENCOUNTER — ROUTINE PRENATAL (OUTPATIENT)
Dept: OBSTETRICS AND GYNECOLOGY | Facility: CLINIC | Age: 35
End: 2020-05-19

## 2020-05-19 VITALS — DIASTOLIC BLOOD PRESSURE: 64 MMHG | WEIGHT: 164 LBS | SYSTOLIC BLOOD PRESSURE: 108 MMHG | BODY MASS INDEX: 26.88 KG/M2

## 2020-05-19 DIAGNOSIS — R76.8 POSITIVE COOMBS TEST: ICD-10-CM

## 2020-05-19 DIAGNOSIS — M32.9 SYSTEMIC LUPUS ERYTHEMATOSUS AFFECTING PREGNANCY IN THIRD TRIMESTER (HCC): ICD-10-CM

## 2020-05-19 DIAGNOSIS — O99.820 GBS (GROUP B STREPTOCOCCUS CARRIER), +RV CULTURE, CURRENTLY PREGNANT: ICD-10-CM

## 2020-05-19 DIAGNOSIS — O99.013 ANEMIA DURING PREGNANCY IN THIRD TRIMESTER: ICD-10-CM

## 2020-05-19 DIAGNOSIS — R79.89 ELEVATED LFTS: ICD-10-CM

## 2020-05-19 DIAGNOSIS — O99.891 SYSTEMIC LUPUS ERYTHEMATOSUS AFFECTING PREGNANCY IN THIRD TRIMESTER (HCC): ICD-10-CM

## 2020-05-19 DIAGNOSIS — O09.93 HIGH-RISK PREGNANCY IN THIRD TRIMESTER: Primary | ICD-10-CM

## 2020-05-19 DIAGNOSIS — O24.113 PRE-EXISTING TYPE 2 DIABETES MELLITUS DURING PREGNANCY IN THIRD TRIMESTER: ICD-10-CM

## 2020-05-19 LAB
GLUCOSE UR STRIP-MCNC: NEGATIVE MG/DL
PROT UR STRIP-MCNC: NEGATIVE MG/DL

## 2020-05-19 PROCEDURE — 99214 OFFICE O/P EST MOD 30 MIN: CPT | Performed by: OBSTETRICS & GYNECOLOGY

## 2020-05-19 RX ORDER — OXYTOCIN 10 [USP'U]/ML
999 INJECTION, SOLUTION INTRAMUSCULAR; INTRAVENOUS ONCE
Status: CANCELLED | OUTPATIENT
Start: 2020-05-19

## 2020-05-19 RX ORDER — LIDOCAINE HYDROCHLORIDE 10 MG/ML
5 INJECTION, SOLUTION EPIDURAL; INFILTRATION; INTRACAUDAL; PERINEURAL AS NEEDED
Status: CANCELLED | OUTPATIENT
Start: 2020-05-19

## 2020-05-19 RX ORDER — MISOPROSTOL 100 UG/1
800 TABLET ORAL AS NEEDED
Status: CANCELLED | OUTPATIENT
Start: 2020-05-19

## 2020-05-19 RX ORDER — OXYTOCIN 10 [USP'U]/ML
250 INJECTION, SOLUTION INTRAMUSCULAR; INTRAVENOUS CONTINUOUS
Status: CANCELLED | OUTPATIENT
Start: 2020-05-19 | End: 2020-05-19

## 2020-05-19 RX ORDER — CARBOPROST TROMETHAMINE 250 UG/ML
250 INJECTION, SOLUTION INTRAMUSCULAR AS NEEDED
Status: CANCELLED | OUTPATIENT
Start: 2020-05-19

## 2020-05-19 RX ORDER — OXYTOCIN 10 [USP'U]/ML
125 INJECTION, SOLUTION INTRAMUSCULAR; INTRAVENOUS CONTINUOUS PRN
Status: CANCELLED | OUTPATIENT
Start: 2020-05-19

## 2020-05-19 RX ORDER — SODIUM CHLORIDE, SODIUM LACTATE, POTASSIUM CHLORIDE, CALCIUM CHLORIDE 600; 310; 30; 20 MG/100ML; MG/100ML; MG/100ML; MG/100ML
125 INJECTION, SOLUTION INTRAVENOUS CONTINUOUS
Status: CANCELLED | OUTPATIENT
Start: 2020-05-19

## 2020-05-19 RX ORDER — METHYLERGONOVINE MALEATE 0.2 MG/ML
200 INJECTION INTRAVENOUS ONCE AS NEEDED
Status: CANCELLED | OUTPATIENT
Start: 2020-05-19

## 2020-05-19 RX ORDER — SODIUM CHLORIDE 0.9 % (FLUSH) 0.9 %
10 SYRINGE (ML) INJECTION AS NEEDED
Status: CANCELLED | OUTPATIENT
Start: 2020-05-19

## 2020-05-19 RX ORDER — SODIUM CHLORIDE 0.9 % (FLUSH) 0.9 %
3 SYRINGE (ML) INJECTION EVERY 12 HOURS SCHEDULED
Status: CANCELLED | OUTPATIENT
Start: 2020-05-19

## 2020-05-19 NOTE — PROGRESS NOTES
Ob follow up    Elza Arcos is a 34 y.o.  37w2d patient being seen today for her obstetrical visit. Patient reports elevated blood pressure and blood sugar reading in the morning. Fetal movement: normal.    Her prenatal care is complicated by (and status) : type II DM, anemia, SLE, SGA, Elevated LFTs, + Lou       ROS -   Fetal Movement good   Vaginal bleeding none   Cramping/Contractions intermittent      /64   Wt 74.4 kg (164 lb)   LMP 2019   BMI 26.88 kg/m²     FHT:  154 BPM    Uterine Size: 33 cm   Presentations: cephalic   Pelvic Exam:     Dilation: 1cm    Effacement: 25%    Station:  -2                 Assessment    1) Pregnancy at 37w2d  2) Fetal status reassuring   3) GBS status - Positive  4) Type II DM   On metformin, had some elevation one AM  Noted poor dietary choices and has improved on same dose after adjusting diet.   To continue   5) Anemia,   Hg stable in 9-10 range   On iron, following with Dr. Mary Ann Berman  6) SLE   On plaquenil and stable   MFM following, see below.   7) SGA  Growth with Dr. Serrano yesterday   18% overall, 11% A/C, normal SARA, dopplers, BPP and MCA doppler   Plan to continue weekly ANT with Dr. Serrano next week.   8) + lou   See below with regard to delivery     Desires primary , patient request   Enough medical issues to have concern with how well will tolerate labor in the first place.   Appears moving to deliver at 39 weeks since issues are all well controlled.   So will plan for primary on 20 and will need both Type and screen and COVID-19 on Friday before that.   (type to look for antibodies)     Plan    Labor warnings   [facility] [place] [place] B[facility]        Tor López MD   2020  13:41

## 2020-05-22 ENCOUNTER — ROUTINE PRENATAL (OUTPATIENT)
Dept: OBSTETRICS AND GYNECOLOGY | Facility: CLINIC | Age: 35
End: 2020-05-22

## 2020-05-22 ENCOUNTER — PROCEDURE VISIT (OUTPATIENT)
Dept: OBSTETRICS AND GYNECOLOGY | Facility: CLINIC | Age: 35
End: 2020-05-22

## 2020-05-22 VITALS — DIASTOLIC BLOOD PRESSURE: 71 MMHG | SYSTOLIC BLOOD PRESSURE: 113 MMHG | WEIGHT: 164 LBS | BODY MASS INDEX: 26.88 KG/M2

## 2020-05-22 DIAGNOSIS — O09.93 HIGH-RISK PREGNANCY IN THIRD TRIMESTER: Primary | ICD-10-CM

## 2020-05-22 DIAGNOSIS — O99.013 ANEMIA DURING PREGNANCY IN THIRD TRIMESTER: ICD-10-CM

## 2020-05-22 DIAGNOSIS — O16.3 ELEVATED BLOOD PRESSURE AFFECTING PREGNANCY IN THIRD TRIMESTER, ANTEPARTUM: ICD-10-CM

## 2020-05-22 DIAGNOSIS — O13.3: ICD-10-CM

## 2020-05-22 DIAGNOSIS — O99.891 SYSTEMIC LUPUS ERYTHEMATOSUS AFFECTING PREGNANCY IN THIRD TRIMESTER (HCC): ICD-10-CM

## 2020-05-22 DIAGNOSIS — O24.113 PRE-EXISTING TYPE 2 DIABETES MELLITUS DURING PREGNANCY IN THIRD TRIMESTER: Primary | ICD-10-CM

## 2020-05-22 DIAGNOSIS — O24.113 PRE-EXISTING TYPE 2 DIABETES MELLITUS DURING PREGNANCY IN THIRD TRIMESTER: ICD-10-CM

## 2020-05-22 DIAGNOSIS — M32.9 SYSTEMIC LUPUS ERYTHEMATOSUS AFFECTING PREGNANCY IN THIRD TRIMESTER (HCC): ICD-10-CM

## 2020-05-22 DIAGNOSIS — O36.8130 DECREASED FETAL MOVEMENTS IN THIRD TRIMESTER, SINGLE OR UNSPECIFIED FETUS: ICD-10-CM

## 2020-05-22 DIAGNOSIS — R79.89 ELEVATED LFTS: ICD-10-CM

## 2020-05-22 LAB
GLUCOSE UR STRIP-MCNC: NEGATIVE MG/DL
PROT UR STRIP-MCNC: NEGATIVE MG/DL

## 2020-05-22 PROCEDURE — 99214 OFFICE O/P EST MOD 30 MIN: CPT | Performed by: OBSTETRICS & GYNECOLOGY

## 2020-05-22 PROCEDURE — 76819 FETAL BIOPHYS PROFIL W/O NST: CPT | Performed by: OBSTETRICS & GYNECOLOGY

## 2020-05-22 NOTE — PROGRESS NOTES
144 Ob follow up    Elza Arcos is a 34 y.o.  37w5d patient being seen today for her obstetrical visit. Patient reports headache this am, B/P this am 134/93.. Fetal movement: decreased    Her prenatal care is complicated by (and status) : type II DM, Anemia, SLE, SGA, Elevated LFTs and + lou       ROS -   Fetal Movement decreased today   Vaginal bleeding none   Cramping/Contractions none      /71   Wt 74.4 kg (164 lb)   LMP 2019   BMI 26.88 kg/m²     FHT:  144 BPM    Uterine Size: 33 cm   Presentations: cephalic   Pelvic Exam:     Dilation: deferred    Effacement: deferred    Station:  deferred                 Assessment    1) Pregnancy at 37w5d  2) Fetal status reassuring   3) GBS status - Positive   4) Hypertension at home with decreased FM   Asked to be seen   BP today excellent in office  No proteinuria   Encouraged to continue to monitor, follow up as scheduled on Tuesday   5) Decreased FM   See above  Did order BPP for today   Encouraged [facility] BID   6) Type II DM   Stable - see note earlier this week   7) Anemia   Stable - See note earlier this week   8) SLE  Stable - See note earlier this week   9) SGA  Stable - See note earlier this week   10) Elevated LFTs/Lou   See note earlier this week.     Plan    Labor warnings   [facility] BID        Tor López MD   2020  12:07

## 2020-05-26 ENCOUNTER — ROUTINE PRENATAL (OUTPATIENT)
Dept: OBSTETRICS AND GYNECOLOGY | Facility: CLINIC | Age: 35
End: 2020-05-26

## 2020-05-26 ENCOUNTER — PROCEDURE VISIT (OUTPATIENT)
Dept: OBSTETRICS AND GYNECOLOGY | Facility: CLINIC | Age: 35
End: 2020-05-26

## 2020-05-26 VITALS — SYSTOLIC BLOOD PRESSURE: 114 MMHG | DIASTOLIC BLOOD PRESSURE: 59 MMHG | WEIGHT: 166 LBS | BODY MASS INDEX: 27.2 KG/M2

## 2020-05-26 DIAGNOSIS — O99.891 SYSTEMIC LUPUS ERYTHEMATOSUS AFFECTING PREGNANCY IN THIRD TRIMESTER (HCC): ICD-10-CM

## 2020-05-26 DIAGNOSIS — O36.8130 DECREASED FETAL MOVEMENTS IN THIRD TRIMESTER, SINGLE OR UNSPECIFIED FETUS: ICD-10-CM

## 2020-05-26 DIAGNOSIS — O24.113 PRE-EXISTING TYPE 2 DIABETES MELLITUS DURING PREGNANCY IN THIRD TRIMESTER: ICD-10-CM

## 2020-05-26 DIAGNOSIS — M32.9 SYSTEMIC LUPUS ERYTHEMATOSUS AFFECTING PREGNANCY IN THIRD TRIMESTER (HCC): ICD-10-CM

## 2020-05-26 DIAGNOSIS — O99.013 ANEMIA DURING PREGNANCY IN THIRD TRIMESTER: ICD-10-CM

## 2020-05-26 DIAGNOSIS — O99.820 GBS (GROUP B STREPTOCOCCUS CARRIER), +RV CULTURE, CURRENTLY PREGNANT: ICD-10-CM

## 2020-05-26 DIAGNOSIS — R76.8 POSITIVE COOMBS TEST: ICD-10-CM

## 2020-05-26 DIAGNOSIS — O36.8130 DECREASED FETAL MOVEMENTS IN THIRD TRIMESTER, SINGLE OR UNSPECIFIED FETUS: Primary | ICD-10-CM

## 2020-05-26 DIAGNOSIS — O09.93 HIGH-RISK PREGNANCY IN THIRD TRIMESTER: Primary | ICD-10-CM

## 2020-05-26 LAB
GLUCOSE UR STRIP-MCNC: NEGATIVE MG/DL
PROT UR STRIP-MCNC: ABNORMAL MG/DL

## 2020-05-26 PROCEDURE — 76819 FETAL BIOPHYS PROFIL W/O NST: CPT | Performed by: OBSTETRICS & GYNECOLOGY

## 2020-05-26 PROCEDURE — 99214 OFFICE O/P EST MOD 30 MIN: CPT | Performed by: OBSTETRICS & GYNECOLOGY

## 2020-05-26 NOTE — PROGRESS NOTES
Ob follow up    Elza Arcos is a 35 y.o.  38w2d patient being seen today for her obstetrical visit. Patient reports no complaints. Fetal movement: normal.    Her prenatal care is complicated by (and status) : Type II DM, Anemia, SLE, SGA, Elevated LFTs and + lou       ROS -   Fetal Movement less movement in day, more at night   Vaginal bleeding none   Cramping/Contractions none      /59   Wt 75.3 kg (166 lb)   LMP 2019   BMI 27.20 kg/m²     FHT:  134 BPM    Uterine Size: 34 cm   Presentations: cephalic   Pelvic Exam:     Dilation: 1-2 cm    Effacement: 50%    Station:  -2                 Assessment    1) Pregnancy at 38w2d  2) Fetal status reassuring   3) GBS status - Positive  4) Type II DM  On Metformin,   Recently her Fasting is 100-103,   Most of the meals are good, except a few 134 with breakfast and lunch over last 10 days  Metformin in AM (recently) - will try BID to see if better fasting control   5) SLE   On plaquenil   Following with MFM   6) Anemia,   Hg has been stable   Was following with Dr. Mary Ann Berman   7) SGA  Recent growth overall reassuring   Weekly BPP and SARA with Dr. Serrano  Scheduled for tomorrow.   8) + lou   See delivery planning     Check BPP today   Keep BPP/Dopplers with Dr. Serrano tomorrow   Check COVID-19/Type and screen Friday   Plan for primary  on Monday     Plan    Labor warnings   [facility] [facility]        Tor López MD   2020  14:02

## 2020-05-29 ENCOUNTER — LAB (OUTPATIENT)
Dept: LAB | Facility: HOSPITAL | Age: 35
End: 2020-05-29

## 2020-05-29 ENCOUNTER — APPOINTMENT (OUTPATIENT)
Dept: LAB | Facility: HOSPITAL | Age: 35
End: 2020-05-29

## 2020-05-29 ENCOUNTER — TELEPHONE (OUTPATIENT)
Dept: OBSTETRICS AND GYNECOLOGY | Facility: CLINIC | Age: 35
End: 2020-05-29

## 2020-05-29 DIAGNOSIS — O09.93 HIGH-RISK PREGNANCY IN THIRD TRIMESTER: ICD-10-CM

## 2020-05-29 DIAGNOSIS — R76.8 POSITIVE COOMBS TEST: ICD-10-CM

## 2020-05-29 DIAGNOSIS — O09.93 HIGH-RISK PREGNANCY SUPERVISION, THIRD TRIMESTER: Primary | ICD-10-CM

## 2020-05-29 LAB
ABO GROUP BLD: NORMAL
BLD GP AB SCN SERPL QL: POSITIVE
DAT C3: NEGATIVE
DAT IGG-SP REAG RBC-IMP: POSITIVE
DAT POLY-SP REAG RBC QL: POSITIVE
RH BLD: POSITIVE
T&S EXPIRATION DATE: NORMAL

## 2020-05-29 PROCEDURE — 86900 BLOOD TYPING SEROLOGIC ABO: CPT | Performed by: OBSTETRICS & GYNECOLOGY

## 2020-05-29 PROCEDURE — 86860 RBC ANTIBODY ELUTION: CPT

## 2020-05-29 PROCEDURE — 36415 COLL VENOUS BLD VENIPUNCTURE: CPT

## 2020-05-29 PROCEDURE — 86921 COMPATIBILITY TEST INCUBATE: CPT

## 2020-05-29 PROCEDURE — 86850 RBC ANTIBODY SCREEN: CPT

## 2020-05-29 PROCEDURE — 86870 RBC ANTIBODY IDENTIFICATION: CPT | Performed by: OBSTETRICS & GYNECOLOGY

## 2020-05-29 PROCEDURE — 86880 COOMBS TEST DIRECT: CPT

## 2020-05-29 PROCEDURE — 86905 BLOOD TYPING RBC ANTIGENS: CPT

## 2020-05-29 PROCEDURE — 86978 RBC PRETREATMENT SERUM: CPT

## 2020-05-29 PROCEDURE — 86970 RBC PRETX INCUBATJ W/CHEMICL: CPT

## 2020-05-29 PROCEDURE — 86920 COMPATIBILITY TEST SPIN: CPT

## 2020-05-29 PROCEDURE — 86901 BLOOD TYPING SEROLOGIC RH(D): CPT | Performed by: OBSTETRICS & GYNECOLOGY

## 2020-05-29 PROCEDURE — U0004 COV-19 TEST NON-CDC HGH THRU: HCPCS

## 2020-05-29 PROCEDURE — 86922 COMPATIBILITY TEST ANTIGLOB: CPT

## 2020-05-29 PROCEDURE — 86870 RBC ANTIBODY IDENTIFICATION: CPT

## 2020-05-29 PROCEDURE — 86850 RBC ANTIBODY SCREEN: CPT | Performed by: OBSTETRICS & GYNECOLOGY

## 2020-05-29 PROCEDURE — 86880 COOMBS TEST DIRECT: CPT | Performed by: OBSTETRICS & GYNECOLOGY

## 2020-05-29 NOTE — TELEPHONE ENCOUNTER
Good Afternoon,    The blood bank called and stated they were unable to identify the antibody so they will need the patient to come in and draw some more blood to send out to a reference lab.

## 2020-05-30 LAB
REF LAB TEST METHOD: NORMAL
SARS-COV-2 RNA RESP QL NAA+PROBE: NOT DETECTED

## 2020-05-30 PROCEDURE — 86902 BLOOD TYPE ANTIGEN DONOR EA: CPT

## 2020-06-01 ENCOUNTER — HOSPITAL ENCOUNTER (INPATIENT)
Facility: HOSPITAL | Age: 35
LOS: 4 days | Discharge: HOME | End: 2020-06-05
Attending: OBSTETRICS & GYNECOLOGY | Admitting: OBSTETRICS & GYNECOLOGY

## 2020-06-01 ENCOUNTER — ANESTHESIA EVENT (OUTPATIENT)
Dept: LABOR AND DELIVERY | Facility: HOSPITAL | Age: 35
End: 2020-06-01

## 2020-06-01 ENCOUNTER — ANESTHESIA (OUTPATIENT)
Dept: LABOR AND DELIVERY | Facility: HOSPITAL | Age: 35
End: 2020-06-01

## 2020-06-01 DIAGNOSIS — R79.89 ELEVATED LFTS: ICD-10-CM

## 2020-06-01 DIAGNOSIS — O24.113 PRE-EXISTING TYPE 2 DIABETES MELLITUS DURING PREGNANCY IN THIRD TRIMESTER: ICD-10-CM

## 2020-06-01 DIAGNOSIS — M32.9 SYSTEMIC LUPUS ERYTHEMATOSUS AFFECTING PREGNANCY IN THIRD TRIMESTER (HCC): ICD-10-CM

## 2020-06-01 DIAGNOSIS — Z98.891 S/P CESAREAN SECTION: Primary | ICD-10-CM

## 2020-06-01 DIAGNOSIS — R76.8 POSITIVE COOMBS TEST: ICD-10-CM

## 2020-06-01 DIAGNOSIS — O99.820 GBS (GROUP B STREPTOCOCCUS CARRIER), +RV CULTURE, CURRENTLY PREGNANT: ICD-10-CM

## 2020-06-01 DIAGNOSIS — O99.891 SYSTEMIC LUPUS ERYTHEMATOSUS AFFECTING PREGNANCY IN THIRD TRIMESTER (HCC): ICD-10-CM

## 2020-06-01 DIAGNOSIS — O09.93 HIGH-RISK PREGNANCY IN THIRD TRIMESTER: ICD-10-CM

## 2020-06-01 DIAGNOSIS — O99.013 ANEMIA DURING PREGNANCY IN THIRD TRIMESTER: ICD-10-CM

## 2020-06-01 LAB
ATMOSPHERIC PRESS: 759.5 MMHG
ATMOSPHERIC PRESS: 760.2 MMHG
BASE EXCESS BLDCOA CALC-SCNC: -0.1 MMOL/L
BASE EXCESS BLDCOV CALC-SCNC: -0.5 MMOL/L (ref -30–30)
BDY SITE: ABNORMAL
BDY SITE: ABNORMAL
COLLECT TME SMN: ABNORMAL
DEPRECATED RDW RBC AUTO: 46 FL (ref 37–54)
ERYTHROCYTE [DISTWIDTH] IN BLOOD BY AUTOMATED COUNT: 15.1 % (ref 12.3–15.4)
EXPIRATION DATE: NORMAL
GAS FLOW AIRWAY: 2 LPM
GAS FLOW AIRWAY: 2 LPM
GLUCOSE BLDC GLUCOMTR-MCNC: 72 MG/DL (ref 70–130)
HCO3 BLDCOA-SCNC: 27.9 MMOL/L (ref 22–28)
HCO3 BLDCOV-SCNC: 25.5 MMOL/L
HCT VFR BLD AUTO: 30.8 % (ref 34–46.6)
HGB BLD-MCNC: 10.1 G/DL (ref 12–15.9)
Lab: NORMAL
MCH RBC QN AUTO: 27.1 PG (ref 26.6–33)
MCHC RBC AUTO-ENTMCNC: 32.8 G/DL (ref 31.5–35.7)
MCV RBC AUTO: 82.6 FL (ref 79–97)
MODALITY: ABNORMAL
MODALITY: ABNORMAL
NOTE: ABNORMAL
NOTE: ABNORMAL
PCO2 BLDCOA: 57.1 MMHG (ref 43–63)
PCO2 BLDCOV: 45.5 MM HG (ref 35–51.3)
PH BLDCOA: 7.3 PH UNITS (ref 7.18–7.34)
PH BLDCOV: 7.36 PH UNITS (ref 7.26–7.4)
PLATELET # BLD AUTO: 223 10*3/MM3 (ref 140–450)
PMV BLD AUTO: 10.7 FL (ref 6–12)
PO2 BLDCOA: <11.9 MMHG (ref 12–26)
PO2 BLDCOV: 23.2 MM HG (ref 19–39)
PROT UR STRIP-MCNC: NEGATIVE MG/DL
RBC # BLD AUTO: 3.73 10*6/MM3 (ref 3.77–5.28)
SAO2 % BLDCOA: 37.5 % (ref 92–99)
SAO2 % BLDCOA: 4.4 % (ref 92–99)
SAO2 % BLDCOV: ABNORMAL %
WBC NRBC COR # BLD: 5.45 10*3/MM3 (ref 3.4–10.8)

## 2020-06-01 PROCEDURE — 63710000001 PROMETHAZINE PER 25 MG: Performed by: OBSTETRICS & GYNECOLOGY

## 2020-06-01 PROCEDURE — 82803 BLOOD GASES ANY COMBINATION: CPT

## 2020-06-01 PROCEDURE — 85027 COMPLETE CBC AUTOMATED: CPT | Performed by: OBSTETRICS & GYNECOLOGY

## 2020-06-01 PROCEDURE — 25010000002 HYDROMORPHONE PER 4 MG: Performed by: ANESTHESIOLOGY

## 2020-06-01 PROCEDURE — 81002 URINALYSIS NONAUTO W/O SCOPE: CPT | Performed by: OBSTETRICS & GYNECOLOGY

## 2020-06-01 PROCEDURE — 88307 TISSUE EXAM BY PATHOLOGIST: CPT

## 2020-06-01 PROCEDURE — 25010000002 ATROPINE PER 0.01 MG: Performed by: ANESTHESIOLOGY

## 2020-06-01 PROCEDURE — 25010000002 ONDANSETRON PER 1 MG: Performed by: ANESTHESIOLOGY

## 2020-06-01 PROCEDURE — 25010000002 PHENYLEPHRINE PER 1 ML: Performed by: ANESTHESIOLOGY

## 2020-06-01 PROCEDURE — 59515 CESAREAN DELIVERY: CPT | Performed by: OBSTETRICS & GYNECOLOGY

## 2020-06-01 PROCEDURE — 86901 BLOOD TYPING SEROLOGIC RH(D): CPT

## 2020-06-01 PROCEDURE — 25010000003 CEFAZOLIN IN DEXTROSE 2-4 GM/100ML-% SOLUTION: Performed by: OBSTETRICS & GYNECOLOGY

## 2020-06-01 PROCEDURE — 25010000002 KETOROLAC TROMETHAMINE PER 15 MG: Performed by: ANESTHESIOLOGY

## 2020-06-01 PROCEDURE — 25010000002 MORPHINE PER 10 MG: Performed by: ANESTHESIOLOGY

## 2020-06-01 PROCEDURE — 59514 CESAREAN DELIVERY ONLY: CPT | Performed by: OBSTETRICS & GYNECOLOGY

## 2020-06-01 PROCEDURE — 86900 BLOOD TYPING SEROLOGIC ABO: CPT

## 2020-06-01 PROCEDURE — 82962 GLUCOSE BLOOD TEST: CPT

## 2020-06-01 RX ORDER — DIPHENHYDRAMINE HCL 25 MG
25 CAPSULE ORAL EVERY 4 HOURS PRN
Status: DISCONTINUED | OUTPATIENT
Start: 2020-06-01 | End: 2020-06-05 | Stop reason: HOSPADM

## 2020-06-01 RX ORDER — OMEGA-3S/DHA/EPA/FISH OIL/D3 300MG-1000
400 CAPSULE ORAL DAILY
Status: DISCONTINUED | OUTPATIENT
Start: 2020-06-01 | End: 2020-06-05 | Stop reason: HOSPADM

## 2020-06-01 RX ORDER — MISOPROSTOL 200 UG/1
800 TABLET ORAL AS NEEDED
Status: DISCONTINUED | OUTPATIENT
Start: 2020-06-01 | End: 2020-06-05 | Stop reason: HOSPADM

## 2020-06-01 RX ORDER — SODIUM CHLORIDE, SODIUM LACTATE, POTASSIUM CHLORIDE, CALCIUM CHLORIDE 600; 310; 30; 20 MG/100ML; MG/100ML; MG/100ML; MG/100ML
125 INJECTION, SOLUTION INTRAVENOUS CONTINUOUS
Status: DISCONTINUED | OUTPATIENT
Start: 2020-06-01 | End: 2020-06-05 | Stop reason: HOSPADM

## 2020-06-01 RX ORDER — OXYCODONE HYDROCHLORIDE AND ACETAMINOPHEN 5; 325 MG/1; MG/1
2 TABLET ORAL EVERY 6 HOURS PRN
Status: DISCONTINUED | OUTPATIENT
Start: 2020-06-01 | End: 2020-06-05 | Stop reason: HOSPADM

## 2020-06-01 RX ORDER — IBUPROFEN 800 MG/1
800 TABLET ORAL EVERY 8 HOURS PRN
Status: DISCONTINUED | OUTPATIENT
Start: 2020-06-01 | End: 2020-06-05 | Stop reason: HOSPADM

## 2020-06-01 RX ORDER — SIMETHICONE 80 MG
80 TABLET,CHEWABLE ORAL 4 TIMES DAILY PRN
Status: DISCONTINUED | OUTPATIENT
Start: 2020-06-01 | End: 2020-06-05 | Stop reason: HOSPADM

## 2020-06-01 RX ORDER — ONDANSETRON 2 MG/ML
INJECTION INTRAMUSCULAR; INTRAVENOUS AS NEEDED
Status: DISCONTINUED | OUTPATIENT
Start: 2020-06-01 | End: 2020-06-01 | Stop reason: SURG

## 2020-06-01 RX ORDER — LANSOPRAZOLE
30 KIT EVERY MORNING
Status: DISCONTINUED | OUTPATIENT
Start: 2020-06-02 | End: 2020-06-05 | Stop reason: HOSPADM

## 2020-06-01 RX ORDER — FERROUS SULFATE 325(65) MG
325 TABLET ORAL
Status: DISCONTINUED | OUTPATIENT
Start: 2020-06-02 | End: 2020-06-05 | Stop reason: HOSPADM

## 2020-06-01 RX ORDER — PROMETHAZINE HYDROCHLORIDE 25 MG/ML
12.5 INJECTION, SOLUTION INTRAMUSCULAR; INTRAVENOUS EVERY 6 HOURS PRN
Status: DISCONTINUED | OUTPATIENT
Start: 2020-06-01 | End: 2020-06-05 | Stop reason: HOSPADM

## 2020-06-01 RX ORDER — HYDROMORPHONE HYDROCHLORIDE 1 MG/ML
0.5 INJECTION, SOLUTION INTRAMUSCULAR; INTRAVENOUS; SUBCUTANEOUS
Status: DISCONTINUED | OUTPATIENT
Start: 2020-06-01 | End: 2020-06-01 | Stop reason: HOSPADM

## 2020-06-01 RX ORDER — ERYTHROMYCIN 5 MG/G
OINTMENT OPHTHALMIC
Status: DISPENSED
Start: 2020-06-01 | End: 2020-06-01

## 2020-06-01 RX ORDER — NALOXONE HCL 0.4 MG/ML
0.2 VIAL (ML) INJECTION
Status: DISCONTINUED | OUTPATIENT
Start: 2020-06-01 | End: 2020-06-05 | Stop reason: HOSPADM

## 2020-06-01 RX ORDER — FOLIC ACID 1 MG/1
1 TABLET ORAL DAILY
Status: DISCONTINUED | OUTPATIENT
Start: 2020-06-01 | End: 2020-06-05 | Stop reason: HOSPADM

## 2020-06-01 RX ORDER — PRENATAL VIT NO.126/IRON/FOLIC 28MG-0.8MG
1 TABLET ORAL DAILY
Status: DISCONTINUED | OUTPATIENT
Start: 2020-06-01 | End: 2020-06-05 | Stop reason: HOSPADM

## 2020-06-01 RX ORDER — MISOPROSTOL 200 UG/1
800 TABLET ORAL AS NEEDED
Status: DISCONTINUED | OUTPATIENT
Start: 2020-06-01 | End: 2020-06-01 | Stop reason: HOSPADM

## 2020-06-01 RX ORDER — OXYTOCIN-SODIUM CHLORIDE 0.9% IV SOLN 30 UNIT/500ML 30-0.9/5 UT/ML-%
250 SOLUTION INTRAVENOUS CONTINUOUS
Status: DISPENSED | OUTPATIENT
Start: 2020-06-01 | End: 2020-06-01

## 2020-06-01 RX ORDER — METHYLERGONOVINE MALEATE 0.2 MG/ML
200 INJECTION INTRAVENOUS ONCE AS NEEDED
Status: DISCONTINUED | OUTPATIENT
Start: 2020-06-01 | End: 2020-06-01 | Stop reason: HOSPADM

## 2020-06-01 RX ORDER — CEFAZOLIN SODIUM 2 G/100ML
2 INJECTION, SOLUTION INTRAVENOUS ONCE
Status: COMPLETED | OUTPATIENT
Start: 2020-06-01 | End: 2020-06-01

## 2020-06-01 RX ORDER — METFORMIN HYDROCHLORIDE 500 MG/1
500 TABLET, EXTENDED RELEASE ORAL
Status: DISCONTINUED | OUTPATIENT
Start: 2020-06-01 | End: 2020-06-05 | Stop reason: HOSPADM

## 2020-06-01 RX ORDER — KETOROLAC TROMETHAMINE 30 MG/ML
INJECTION, SOLUTION INTRAMUSCULAR; INTRAVENOUS AS NEEDED
Status: DISCONTINUED | OUTPATIENT
Start: 2020-06-01 | End: 2020-06-01 | Stop reason: SURG

## 2020-06-01 RX ORDER — PHYTONADIONE 1 MG/.5ML
INJECTION, EMULSION INTRAMUSCULAR; INTRAVENOUS; SUBCUTANEOUS
Status: DISPENSED
Start: 2020-06-01 | End: 2020-06-01

## 2020-06-01 RX ORDER — FAMOTIDINE 10 MG/ML
20 INJECTION, SOLUTION INTRAVENOUS ONCE AS NEEDED
Status: COMPLETED | OUTPATIENT
Start: 2020-06-01 | End: 2020-06-01

## 2020-06-01 RX ORDER — CARBOPROST TROMETHAMINE 250 UG/ML
250 INJECTION, SOLUTION INTRAMUSCULAR AS NEEDED
Status: DISCONTINUED | OUTPATIENT
Start: 2020-06-01 | End: 2020-06-01 | Stop reason: HOSPADM

## 2020-06-01 RX ORDER — SODIUM CHLORIDE 0.9 % (FLUSH) 0.9 %
3 SYRINGE (ML) INJECTION EVERY 12 HOURS SCHEDULED
Status: DISCONTINUED | OUTPATIENT
Start: 2020-06-01 | End: 2020-06-01 | Stop reason: HOSPADM

## 2020-06-01 RX ORDER — SODIUM CHLORIDE 0.9 % (FLUSH) 0.9 %
10 SYRINGE (ML) INJECTION AS NEEDED
Status: DISCONTINUED | OUTPATIENT
Start: 2020-06-01 | End: 2020-06-01 | Stop reason: HOSPADM

## 2020-06-01 RX ORDER — OXYTOCIN-SODIUM CHLORIDE 0.9% IV SOLN 30 UNIT/500ML 30-0.9/5 UT/ML-%
125 SOLUTION INTRAVENOUS CONTINUOUS PRN
Status: COMPLETED | OUTPATIENT
Start: 2020-06-01 | End: 2020-06-01

## 2020-06-01 RX ORDER — PROMETHAZINE HYDROCHLORIDE 25 MG/1
12.5 SUPPOSITORY RECTAL EVERY 6 HOURS PRN
Status: DISCONTINUED | OUTPATIENT
Start: 2020-06-01 | End: 2020-06-05 | Stop reason: HOSPADM

## 2020-06-01 RX ORDER — DIPHENHYDRAMINE HYDROCHLORIDE 50 MG/ML
25 INJECTION INTRAMUSCULAR; INTRAVENOUS EVERY 4 HOURS PRN
Status: DISCONTINUED | OUTPATIENT
Start: 2020-06-01 | End: 2020-06-05 | Stop reason: HOSPADM

## 2020-06-01 RX ORDER — HYDROXYCHLOROQUINE SULFATE 200 MG/1
200 TABLET, FILM COATED ORAL NIGHTLY
Status: DISCONTINUED | OUTPATIENT
Start: 2020-06-01 | End: 2020-06-05 | Stop reason: HOSPADM

## 2020-06-01 RX ORDER — OMEPRAZOLE 40 MG/1
40 CAPSULE, DELAYED RELEASE ORAL DAILY
COMMUNITY
End: 2020-07-14

## 2020-06-01 RX ORDER — ATROPINE SULFATE 0.4 MG/ML
AMPUL (ML) INJECTION AS NEEDED
Status: DISCONTINUED | OUTPATIENT
Start: 2020-06-01 | End: 2020-06-01 | Stop reason: SURG

## 2020-06-01 RX ORDER — HYDROXYZINE 50 MG/1
50 TABLET, FILM COATED ORAL EVERY 6 HOURS PRN
Status: DISCONTINUED | OUTPATIENT
Start: 2020-06-01 | End: 2020-06-05 | Stop reason: HOSPADM

## 2020-06-01 RX ORDER — ACETAMINOPHEN 500 MG
1000 TABLET ORAL ONCE
Status: COMPLETED | OUTPATIENT
Start: 2020-06-01 | End: 2020-06-01

## 2020-06-01 RX ORDER — MORPHINE SULFATE 2 MG/ML
2 INJECTION, SOLUTION INTRAMUSCULAR; INTRAVENOUS
Status: ACTIVE | OUTPATIENT
Start: 2020-06-01 | End: 2020-06-02

## 2020-06-01 RX ORDER — LIDOCAINE HYDROCHLORIDE 10 MG/ML
5 INJECTION, SOLUTION EPIDURAL; INFILTRATION; INTRACAUDAL; PERINEURAL AS NEEDED
Status: DISCONTINUED | OUTPATIENT
Start: 2020-06-01 | End: 2020-06-01 | Stop reason: HOSPADM

## 2020-06-01 RX ORDER — ONDANSETRON 2 MG/ML
4 INJECTION INTRAMUSCULAR; INTRAVENOUS ONCE AS NEEDED
Status: COMPLETED | OUTPATIENT
Start: 2020-06-01 | End: 2020-06-01

## 2020-06-01 RX ORDER — MORPHINE SULFATE 1 MG/ML
INJECTION, SOLUTION EPIDURAL; INTRATHECAL; INTRAVENOUS
Status: COMPLETED | OUTPATIENT
Start: 2020-06-01 | End: 2020-06-01

## 2020-06-01 RX ORDER — OXYTOCIN-SODIUM CHLORIDE 0.9% IV SOLN 30 UNIT/500ML 30-0.9/5 UT/ML-%
999 SOLUTION INTRAVENOUS ONCE
Status: COMPLETED | OUTPATIENT
Start: 2020-06-01 | End: 2020-06-01

## 2020-06-01 RX ORDER — PROMETHAZINE HYDROCHLORIDE 25 MG/1
25 TABLET ORAL EVERY 6 HOURS PRN
Status: DISCONTINUED | OUTPATIENT
Start: 2020-06-01 | End: 2020-06-05 | Stop reason: HOSPADM

## 2020-06-01 RX ORDER — HYDROCORTISONE 25 MG/G
CREAM TOPICAL 3 TIMES DAILY PRN
Status: DISCONTINUED | OUTPATIENT
Start: 2020-06-01 | End: 2020-06-05 | Stop reason: HOSPADM

## 2020-06-01 RX ADMIN — ONDANSETRON 4 MG: 2 INJECTION INTRAMUSCULAR; INTRAVENOUS at 10:10

## 2020-06-01 RX ADMIN — FAMOTIDINE 20 MG: 10 INJECTION, SOLUTION INTRAVENOUS at 10:11

## 2020-06-01 RX ADMIN — KETOROLAC TROMETHAMINE 30 MG: 30 INJECTION, SOLUTION INTRAMUSCULAR; INTRAVENOUS at 11:57

## 2020-06-01 RX ADMIN — ACETAMINOPHEN 1000 MG: 500 TABLET, FILM COATED ORAL at 10:12

## 2020-06-01 RX ADMIN — ONDANSETRON 4 MG: 2 INJECTION INTRAMUSCULAR; INTRAVENOUS at 17:00

## 2020-06-01 RX ADMIN — HYDROXYCHLOROQUINE SULFATE 200 MG: 200 TABLET, FILM COATED ORAL at 21:15

## 2020-06-01 RX ADMIN — CEFAZOLIN SODIUM 2 G: 2 INJECTION, SOLUTION INTRAVENOUS at 10:45

## 2020-06-01 RX ADMIN — PHENYLEPHRINE HYDROCHLORIDE 100 MCG: 10 INJECTION INTRAVENOUS at 11:24

## 2020-06-01 RX ADMIN — ATROPINE SULFATE 0.4 MG: 0.4 INJECTION, SOLUTION INTRAMUSCULAR; INTRAVENOUS; SUBCUTANEOUS at 11:26

## 2020-06-01 RX ADMIN — OXYTOCIN 999 ML/HR: 10 INJECTION INTRAVENOUS at 11:33

## 2020-06-01 RX ADMIN — OXYCODONE AND ACETAMINOPHEN 2 TABLET: 5; 325 TABLET ORAL at 22:07

## 2020-06-01 RX ADMIN — SODIUM CHLORIDE, POTASSIUM CHLORIDE, SODIUM LACTATE AND CALCIUM CHLORIDE: 600; 310; 30; 20 INJECTION, SOLUTION INTRAVENOUS at 12:03

## 2020-06-01 RX ADMIN — OXYTOCIN 125 ML/HR: 10 INJECTION, SOLUTION INTRAMUSCULAR; INTRAVENOUS at 13:14

## 2020-06-01 RX ADMIN — IBUPROFEN 800 MG: 800 TABLET ORAL at 22:07

## 2020-06-01 RX ADMIN — HYDROMORPHONE HYDROCHLORIDE 0.5 MG: 1 INJECTION, SOLUTION INTRAMUSCULAR; INTRAVENOUS; SUBCUTANEOUS at 13:18

## 2020-06-01 RX ADMIN — MORPHINE SULFATE 200 MCG: 1 INJECTION, SOLUTION EPIDURAL; INTRATHECAL; INTRAVENOUS at 11:15

## 2020-06-01 RX ADMIN — SODIUM CHLORIDE, POTASSIUM CHLORIDE, SODIUM LACTATE AND CALCIUM CHLORIDE 125 ML/HR: 600; 310; 30; 20 INJECTION, SOLUTION INTRAVENOUS at 19:01

## 2020-06-01 RX ADMIN — PROMETHAZINE HYDROCHLORIDE 25 MG: 25 TABLET ORAL at 21:15

## 2020-06-01 RX ADMIN — ONDANSETRON HYDROCHLORIDE 4 MG: 2 SOLUTION INTRAMUSCULAR; INTRAVENOUS at 11:57

## 2020-06-01 RX ADMIN — SODIUM CHLORIDE, POTASSIUM CHLORIDE, SODIUM LACTATE AND CALCIUM CHLORIDE 1000 ML: 600; 310; 30; 20 INJECTION, SOLUTION INTRAVENOUS at 08:28

## 2020-06-01 RX ADMIN — OXYCODONE AND ACETAMINOPHEN 2 TABLET: 5; 325 TABLET ORAL at 14:14

## 2020-06-01 RX ADMIN — SODIUM CHLORIDE, POTASSIUM CHLORIDE, SODIUM LACTATE AND CALCIUM CHLORIDE 125 ML/HR: 600; 310; 30; 20 INJECTION, SOLUTION INTRAVENOUS at 09:33

## 2020-06-01 NOTE — ADDENDUM NOTE
Addendum  created 06/01/20 1250 by Inderjit Cheatham MD    Order list changed, Order sets accessed

## 2020-06-01 NOTE — LACTATION NOTE
This note was copied from a baby's chart.  P1 term baby. Mom just breast fed for 20 min and baby is sleeping. Discussed ways to determine if baby is getting enough milk, STS and encouraged to call for any assist. She has a personal pump.

## 2020-06-01 NOTE — ANESTHESIA POSTPROCEDURE EVALUATION
Patient: Elza Arcos    Procedure Summary     Date:  20 Room / Location:   [facility]    Anesthesia Start:  1058 Anesthesia Stop:  1219    Procedure:   SECTION PRIMARY (N/A Abdomen) Diagnosis:       High-risk pregnancy in third trimester      Pre-existing type 2 diabetes mellitus during pregnancy in third trimester      Systemic lupus erythematosus affecting pregnancy in third trimester (CMS/HCC)      Anemia during pregnancy in third trimester      Positive Angie test      Elevated LFTs      GBS (group B Streptococcus carrier), +RV culture, currently pregnant      (High-risk pregnancy in third trimester [O09.93])      (Pre-existing type 2 diabetes mellitus during pregnancy in third trimester [O24.113])      (Systemic lupus erythematosus affecting pregnancy in third trimester (CMS/HCC) [O99.89, M32.9])      (Anemia during pregnancy in third trimester [O99.013])      (Positive Angie test [R76.8])      (Elevated LFTs [R94.5])      (GBS (group B Streptococcus carrier), +RV culture, currently pregnant [O99.820])    Surgeon:  Tor López MD Provider:  Froylan Gamez MD    Anesthesia Type:  spinal ASA Status:  3          Anesthesia Type: spinal    Vitals  Vitals Value Taken Time   /65 2020 12:16 PM   Temp     Pulse 73 2020 12:16 PM   Resp     SpO2     Vitals shown include unvalidated device data.        Post Anesthesia Care and Evaluation    Patient location during evaluation: bedside  Patient participation: complete - patient participated  Level of consciousness: awake and alert  Pain management: adequate  Airway patency: patent  Anesthetic complications: No anesthetic complications    Cardiovascular status: acceptable  Respiratory status: acceptable  Hydration status: acceptable  Post Neuraxial Block status: Motor and sensory function returned to baseline and No signs or symptoms of PDPH  Comments: /69 (BP Location: Right arm,  "Patient Position: Sitting)   Pulse 102   Temp 36.8 °C (98.3 °F) (Oral)   Resp 18   Ht 165.1 cm (65\")   Wt 74.8 kg (164 lb 12.8 oz)   LMP 08/20/2019   SpO2 100%   Breastfeeding Yes   BMI 27.42 kg/m²       "

## 2020-06-01 NOTE — ANESTHESIA PREPROCEDURE EVALUATION
Anesthesia Evaluation     Patient summary reviewed and Nursing notes reviewed   no history of anesthetic complications:               Airway   Mallampati: II  TM distance: >3 FB  Neck ROM: full  no difficulty expected  Dental - normal exam     Pulmonary - negative pulmonary ROS    breath sounds clear to auscultation  (-) shortness of breath, sleep apnea, decreased breath sounds, wheezes  Cardiovascular - normal exam  Exercise tolerance: good (4-7 METS)    Rhythm: regular  Rate: normal    (-) past MI, angina, CHF, orthopnea, PND, NGUYEN, PVD      Neuro/Psych- negative ROS  (-) seizures, neuromuscular disease, TIA, CVA, dizziness/light headedness, weakness, numbness  GI/Hepatic/Renal/Endo    (+)   diabetes mellitus,   (-) liver disease    Musculoskeletal (-) negative ROS    Abdominal  - normal exam   Substance History - negative use  (-) alcohol use, drug use     OB/GYN    (+) Pregnant,         Other   autoimmune disease lupus,                      Anesthesia Plan    ASA 3     spinal   (  39w1d  )    Anesthetic plan, all risks, benefits, and alternatives have been provided, discussed and informed consent has been obtained with: patient and spouse.

## 2020-06-01 NOTE — H&P
[facility]  Obstetric History and Physical    Chief Complaint   Patient presents with   • Scheduled      pt admitted for primary c/s for pt choice. pt denies pain currently but sometimes feels cramping with ctx, states +FM, denies vaginal bleeding or leaking, abd soft to palpation and nontender            Patient is a 35 y.o. female  currently at 39w1d, who presents with scheduled primary .    Her prenatal care was with Dr. López and was complicated by:  - Type II DM - On metformin for pregnancy with good control, recently increased to 2 x per week for some elevated fasting blood sugars   - SLE - On plaquenil - following with rheumatology and [facility]   - Anemia - Has been on iron, stable - Following with Dr. Mary Ann Berman of hematology   - SGA - Stayed above IUGR - has been closely monitored by Dr. Serrano of [facility]   - + Angie testing - Did have her come in Friday for type and cross due to atypical antibody.    - Elevated LFTs - in April with some borderline LFT    Elevation    - Desired primary  -      External Prenatal Results     Pregnancy Outside Results - Transcribed From Office Records - See Scanned Records For Details     Test Value Date Time    Hgb 10.1 g/dL 20 0828      10.1 g/dL 20 1002      10.3 g/dL 20 1422      9.4 g/dL 20 0823      9.9 g/dL 20 0920      9.9 g/dL 20 1302      9.9 g/dL 20 1342      9.5 g/dL 19 1554    Hct 30.8 % 20 0828      32.3 % 20 1002      30.7 % 20 1422      30.5 % 20 0823      30.6 % 20 0920      31.5 % 20 1302      30.5 % 20 1342      33.0 % 19 1438      31.1 % 19 1554    ABO O  20 1006    Rh Positive  20 1006    Antibody Screen Positive  20 1006      Abnormal  20     Glucose Fasting GTT       Glucose Tolerance Test 1 hour       Glucose Tolerance Test 3 hour       Gonorrhea (discrete)       Chlamydia (discrete)       RPR  Non-Reactive  20     VDRL       Syphilis Antibody       Rubella immune  20     HBsAg Negative  20     Herpes Simplex Virus PCR       Herpes Simplex VIrus Culture       HIV neg  20     Hep C RNA Quant PCR       Hep C Antibody neg  20     AFP       Group B Strep Positive  20 1432    GBS Susceptibility to Clindamycin       GBS Susceptibility to Erythromycin       Fetal Fibronectin Positive  20 1326    Genetic Testing, Maternal Blood             Drug Screening     Test Value Date Time    Urine Drug Screen       Amphetamine Screen       Barbiturate Screen       Benzodiazepine Screen       Methadone Screen       Phencyclidine Screen       Opiates Screen       THC Screen       Cocaine Screen       Propoxyphene Screen       Buprenorphine Screen       Methamphetamine Screen       Oxycodone Screen       Tricyclic Antidepressants Screen                      OB History    Para Term  AB Living   3 0 0 0 2 0   SAB TAB Ectopic Molar Multiple Live Births   1 1 0 0 0 0      # Outcome Date GA Lbr Arya/2nd Weight Sex Delivery Anes PTL Lv   3 Current            2 SAB 19           1 TAB  10w0d                  Past Medical History:   Diagnosis Date   •     • Anemia    • Diabetes mellitus (CMS/HCC)    • Glaucoma    • Lupus (CMS/HCC)    • Miscarriage    • Polycystic ovarian syndrome           Past Surgical History:   Procedure Laterality Date   • DILATATION AND CURETTAGE     • LIPOSUCTION            No current facility-administered medications on file prior to encounter.      Current Outpatient Medications on File Prior to Encounter   Medication Sig Dispense Refill   • ferrous sulfate 325 (65 Fe) MG tablet Take  by mouth.     • folic acid (FOLVITE) 1 MG tablet Take 1 mg by mouth Daily.     • hydroxychloroquine (PLAQUENIL) 200 MG tablet TK 1 AND 1/2 TS PO D. START 1 T D FOR FIRST 7 DAYS     • metFORMIN ER (GLUCOPHAGE-XR) 500 MG 24 hr tablet Take 1 tablet by mouth  Daily. 30 tablet 3   • omeprazole (priLOSEC) 40 MG capsule Take 40 mg by mouth Daily.     • Prenatal Vit-Fe Fumarate-FA (PREPLUS) 27-1 MG tablet Take 1 tablet by mouth Daily. 30 tablet 12   • Acetone, Urine, Test (KETONE TEST) strip QAM BEFORE BREAKFAST TEST IN THE MORNING FOR KETONES IN URINE     • Blood Glucose Monitoring Suppl (FREESTYLE LITE) device Check blood sugar 4 x daily (fasting and 2 hours after breakfast, lunch and dinner) 1 each 1   • Glucose Blood (BLOOD GLUCOSE TEST) strip 1 each by In Vitro route 4 (Four) Times a Day. Test fasting and 2 hours after meals 120 each 3   • Lancets misc 1 each 4 (Four) Times a Day. Use to test fasting and 2 hours after meals 120 each 3   • ondansetron (ZOFRAN) 4 MG tablet Take 4 mg by mouth.     • Urine Glucose-Ketones Test strip 1 each by In Vitro route Every Morning Before Breakfast. Test in morning for ketones in urine 30 each 3   • vitamin d (CHOLECALIFEROL) 125 MCG (5000 UT) capsule Take 1 capsule by mouth 1 (One) Time Per Week. 12 capsule 3        No Known Allergies       Social History     Socioeconomic History   • Marital status: Single     Spouse name: Not on file   • Number of children: Not on file   • Years of education: Not on file   • Highest education level: Not on file   Tobacco Use   • Smoking status: Never Smoker   • Smokeless tobacco: Never Used   Substance and Sexual Activity   • Alcohol use: No     Frequency: Never     Comment: not since being preg   • Drug use: No   • Sexual activity: Yes     Partners: Male          Family History   Problem Relation Age of Onset   • Hypertension Father    • Hypertension Mother    • Diabetes Mother    • Hyperthyroidism Mother    • Arthritis Mother    • Breast cancer Neg Hx    • Ovarian cancer Neg Hx    • Uterine cancer Neg Hx    • Colon cancer Neg Hx    • Deep vein thrombosis Neg Hx    • Pulmonary embolism Neg Hx         Review of Systems   Constitutional: Negative for fever.   Gastrointestinal: Negative for abdominal  pain.   Genitourinary: Negative for dysuria, pelvic pain and vaginal bleeding.   All other systems reviewed and are negative.      Temp:  [98.3 °F (36.8 °C)] 98.3 °F (36.8 °C)  Heart Rate:  [102] 102  Resp:  [18] 18  BP: (120)/(69) 120/69    Physical Exam   Constitutional: She is oriented to person, place, and time. She appears well-developed and well-nourished. No distress.   HENT:   Head: Normocephalic and atraumatic.   Eyes: Conjunctivae are normal. Right eye exhibits no discharge. Left eye exhibits no discharge.   Neck: Normal range of motion. Neck supple. No thyromegaly present.   Cardiovascular: Normal rate, regular rhythm and normal heart sounds.   No murmur heard.  Pulmonary/Chest: Effort normal and breath sounds normal. No respiratory distress.   Abdominal: Soft. Bowel sounds are normal. She exhibits distension (EFW 6#). There is no tenderness.   Musculoskeletal: Normal range of motion. She exhibits edema (1+).   Lymphadenopathy:     She has no cervical adenopathy.   Neurological: She is alert and oriented to person, place, and time.   Skin: Skin is warm and dry. No rash noted.   Psychiatric: She has a normal mood and affect. Her behavior is normal. Judgment and thought content normal.         FHT - Reassuring, class 1  [place] - No labor     Lab Results   Component Value Date    WBC 5.45 2020    HGB 10.1 (L) 2020    HCT 30.8 (L) 2020    MCV 82.6 2020     2020         High-risk pregnancy in third trimester    GBS (group B Streptococcus carrier), +RV culture, currently pregnant    Pre-existing type 2 diabetes mellitus during pregnancy in third trimester    Systemic lupus erythematosus affecting pregnancy in third trimester (CMS/HCC)    Anemia during pregnancy in third trimester    Positive Angie test    Elevated LFTs      Assessment:  1.  Intrauterine pregnancy at 39w1d weeks gestation with reassuring fetal status.    2.  Scheduled primary    3.  Obstetrical  history significant for See above - Type II DM, SLE, SGA, Anemia, + antibodies and elevation in LFTs.  4.  GBS status: .positive     Plan:  1. Primary  scheduled  2. Plan of care has been reviewed with patient and   3.  Risks, benefits of treatment plan have been discussed.  4.  All questions have been answered.        Tor López MD  2020  09:09

## 2020-06-01 NOTE — PLAN OF CARE
Problem: Patient Care Overview  Goal: Plan of Care Review  Flowsheets  Taken 6/1/2020 1217  Plan of Care Reviewed With: patient;significant other  Taken 6/1/2020 1240  Outcome Summary: pt admitted for primary c/s for pt choice. pt pre-op medications and care provided. pre-op abx administered. c/s delivery at 1132. infant and pt doing well in recovery, infant in kangaroo care and is breastfeeding

## 2020-06-01 NOTE — ANESTHESIA PROCEDURE NOTES
Spinal Block      Patient location during procedure: OB  Start Time: 6/1/2020 11:15 AM  Stop Time: 6/1/2020 11:17 AM  Indication:at surgeon's request  Performed By  Anesthesiologist: Froylan Gamez MD  Preanesthetic Checklist  Completed: patient identified, site marked, surgical consent, pre-op evaluation, timeout performed, IV checked, risks and benefits discussed and monitors and equipment checked  Spinal Block Prep:  Patient Position:sitting  Sterile Tech:cap, gloves, mask and sterile barriers  Prep:Betadine  Patient Monitoring:blood pressure monitoring, continuous pulse oximetry and EKG  Spinal Block Procedure  Approach:midline  Guidance:landmark technique and palpation technique  Location:L4-L5  Needle Type:Pura  Needle Gauge:25  Placement of Spinal needle event:cerebrospinal fluid aspirated  Paresthesia: no  Fluid Appearance:clear  Medications: Morphine PF injection, 200 mcg  Med Administered at 6/1/2020 11:15 AM   Post Assessment  Patient Tolerance:patient tolerated the procedure well with no apparent complications  Complications no

## 2020-06-02 LAB
ALBUMIN SERPL-MCNC: 2.5 G/DL (ref 3.5–5.2)
ALBUMIN/GLOB SERPL: 0.7 G/DL
ALP SERPL-CCNC: 91 U/L (ref 39–117)
ALT SERPL W P-5'-P-CCNC: 15 U/L (ref 1–33)
ANION GAP SERPL CALCULATED.3IONS-SCNC: 7.7 MMOL/L (ref 5–15)
AST SERPL-CCNC: 22 U/L (ref 1–32)
BASOPHILS # BLD AUTO: 0.01 10*3/MM3 (ref 0–0.2)
BASOPHILS NFR BLD AUTO: 0.2 % (ref 0–1.5)
BILIRUB SERPL-MCNC: <0.2 MG/DL (ref 0.2–1.2)
BUN BLD-MCNC: 3 MG/DL (ref 6–20)
BUN/CREAT SERPL: 5.7 (ref 7–25)
CALCIUM SPEC-SCNC: 8.3 MG/DL (ref 8.6–10.5)
CHLORIDE SERPL-SCNC: 101 MMOL/L (ref 98–107)
CO2 SERPL-SCNC: 21.3 MMOL/L (ref 22–29)
CREAT BLD-MCNC: 0.53 MG/DL (ref 0.57–1)
DEPRECATED RDW RBC AUTO: 42.6 FL (ref 37–54)
EOSINOPHIL # BLD AUTO: 0.05 10*3/MM3 (ref 0–0.4)
EOSINOPHIL NFR BLD AUTO: 0.9 % (ref 0.3–6.2)
ERYTHROCYTE [DISTWIDTH] IN BLOOD BY AUTOMATED COUNT: 14.5 % (ref 12.3–15.4)
GFR SERPL CREATININE-BSD FRML MDRD: >150 ML/MIN/1.73
GLOBULIN UR ELPH-MCNC: 3.7 GM/DL
GLUCOSE BLD-MCNC: 100 MG/DL (ref 65–99)
GLUCOSE BLDC GLUCOMTR-MCNC: 105 MG/DL (ref 70–130)
GLUCOSE BLDC GLUCOMTR-MCNC: 106 MG/DL (ref 70–130)
GLUCOSE BLDC GLUCOMTR-MCNC: 137 MG/DL (ref 70–130)
GLUCOSE BLDC GLUCOMTR-MCNC: 88 MG/DL (ref 70–130)
GLUCOSE BLDC GLUCOMTR-MCNC: 92 MG/DL (ref 70–130)
GLUCOSE BLDC GLUCOMTR-MCNC: 97 MG/DL (ref 70–130)
HCT VFR BLD AUTO: 25.5 % (ref 34–46.6)
HGB BLD-MCNC: 8.6 G/DL (ref 12–15.9)
IMM GRANULOCYTES # BLD AUTO: 0.02 10*3/MM3 (ref 0–0.05)
IMM GRANULOCYTES NFR BLD AUTO: 0.4 % (ref 0–0.5)
LYMPHOCYTES # BLD AUTO: 1.04 10*3/MM3 (ref 0.7–3.1)
LYMPHOCYTES NFR BLD AUTO: 18.8 % (ref 19.6–45.3)
MCH RBC QN AUTO: 27.4 PG (ref 26.6–33)
MCHC RBC AUTO-ENTMCNC: 33.7 G/DL (ref 31.5–35.7)
MCV RBC AUTO: 81.2 FL (ref 79–97)
MONOCYTES # BLD AUTO: 0.52 10*3/MM3 (ref 0.1–0.9)
MONOCYTES NFR BLD AUTO: 9.4 % (ref 5–12)
NEUTROPHILS # BLD AUTO: 3.9 10*3/MM3 (ref 1.7–7)
NEUTROPHILS NFR BLD AUTO: 70.3 % (ref 42.7–76)
NRBC BLD AUTO-RTO: 0 /100 WBC (ref 0–0.2)
PLATELET # BLD AUTO: 200 10*3/MM3 (ref 140–450)
PMV BLD AUTO: 11.3 FL (ref 6–12)
POTASSIUM BLD-SCNC: 3.3 MMOL/L (ref 3.5–5.2)
PROT SERPL-MCNC: 6.2 G/DL (ref 6–8.5)
RBC # BLD AUTO: 3.14 10*6/MM3 (ref 3.77–5.28)
SODIUM BLD-SCNC: 130 MMOL/L (ref 136–145)
WBC NRBC COR # BLD: 5.54 10*3/MM3 (ref 3.4–10.8)

## 2020-06-02 PROCEDURE — 99024 POSTOP FOLLOW-UP VISIT: CPT | Performed by: OBSTETRICS & GYNECOLOGY

## 2020-06-02 PROCEDURE — 80053 COMPREHEN METABOLIC PANEL: CPT | Performed by: OBSTETRICS & GYNECOLOGY

## 2020-06-02 PROCEDURE — 85025 COMPLETE CBC W/AUTO DIFF WBC: CPT | Performed by: OBSTETRICS & GYNECOLOGY

## 2020-06-02 PROCEDURE — 82962 GLUCOSE BLOOD TEST: CPT

## 2020-06-02 RX ORDER — DOCUSATE SODIUM 100 MG/1
100 CAPSULE, LIQUID FILLED ORAL 2 TIMES DAILY PRN
Status: DISCONTINUED | OUTPATIENT
Start: 2020-06-02 | End: 2020-06-05 | Stop reason: HOSPADM

## 2020-06-02 RX ADMIN — OXYCODONE AND ACETAMINOPHEN 2 TABLET: 5; 325 TABLET ORAL at 03:46

## 2020-06-02 RX ADMIN — FERROUS SULFATE TAB 325 MG (65 MG ELEMENTAL FE) 325 MG: 325 (65 FE) TAB at 08:16

## 2020-06-02 RX ADMIN — SIMETHICONE CHEW TAB 80 MG 80 MG: 80 TABLET ORAL at 15:26

## 2020-06-02 RX ADMIN — IBUPROFEN 800 MG: 800 TABLET ORAL at 06:14

## 2020-06-02 RX ADMIN — DOCUSATE SODIUM 100 MG: 100 CAPSULE, LIQUID FILLED ORAL at 21:00

## 2020-06-02 RX ADMIN — METFORMIN HYDROCHLORIDE 500 MG: 500 TABLET, EXTENDED RELEASE ORAL at 12:09

## 2020-06-02 RX ADMIN — OXYCODONE AND ACETAMINOPHEN 2 TABLET: 5; 325 TABLET ORAL at 21:33

## 2020-06-02 RX ADMIN — FOLIC ACID 1 MG: 1 TABLET ORAL at 08:17

## 2020-06-02 RX ADMIN — CHOLECALCIFEROL TAB 10 MCG (400 UNIT) 400 UNITS: 10 TAB at 08:17

## 2020-06-02 RX ADMIN — Medication 1 TABLET: at 08:17

## 2020-06-02 RX ADMIN — OXYCODONE AND ACETAMINOPHEN 2 TABLET: 5; 325 TABLET ORAL at 09:50

## 2020-06-02 RX ADMIN — HYDROXYCHLOROQUINE SULFATE 200 MG: 200 TABLET, FILM COATED ORAL at 21:00

## 2020-06-02 RX ADMIN — IBUPROFEN 800 MG: 800 TABLET ORAL at 15:12

## 2020-06-02 RX ADMIN — OXYCODONE AND ACETAMINOPHEN 2 TABLET: 5; 325 TABLET ORAL at 15:12

## 2020-06-02 NOTE — PROGRESS NOTES
Assessment/Plan:  Status post  section. Doing well postoperatively.     Continue current care.  Plannng to resume iron at DC possible Thursday     Subjective:  Postpartum Day 1:  Delivery    The patient feels well.  Pain is well controlled with current medications. The baby iswell. . Urinary output is adequate. The patient is ambulating well. The patient is tolerating a normal diet. Flatus denies been passed.    Objective:  Vital signs in last 24 hours:  Temp:  [97.4 °F (36.3 °C)-98.9 °F (37.2 °C)] 98.9 °F (37.2 °C)  Heart Rate:  [64-94] 81  Resp:  [14-18] 14  BP: (107-132)/(53-73) 107/69      General:    alert, appears stated age and cooperative   Bowel Sounds:  active   Lochia:  appropriate   Uterine Fundus:   firm   Incision:  healing well, no significant drainage, no dehiscence, no significant erythema   DVT Evaluation:  No evidence of DVT seen on physical exam.       Female Baby    Blood type: O  +   Rubella: Immune   HgB= 8.6 [ 10.1 preop]

## 2020-06-02 NOTE — LACTATION NOTE
This note was copied from a baby's chart.  Mother states that infant has been feeding well, feels the latch is good, nipples intact and round after a feed, infant has been voiding well and eating every 3 hours. Demonstrated how to use hand pump, she states she also has electric pump at home. Mother denies any questions/concerns, advised to call as needed.

## 2020-06-02 NOTE — PLAN OF CARE
Postoperative day 1, surgical dressing intact, dressing is clean, dry and intact. Pain managed with Percocet and Motrin per MD order. Clear diet continued, complaints of nausea at the beginning of shift, phenergan given per MD order, effective results. No signs and symptoms of hypoglycemia or hyperglycemia noted. Vital signs within normal limits for patients. Perales removed at 0320, 6/2/2020, clear yellow urine. Ambulates without difficulty, slow steady gait. Will continue to assess.     Problem: Patient Care Overview  Goal: Plan of Care Review  Outcome: Ongoing (interventions implemented as appropriate)  Flowsheets  Taken 6/2/2020 0403  Progress: improving  Taken 6/1/2020 1940  Plan of Care Reviewed With: spouse

## 2020-06-02 NOTE — PROGRESS NOTES
"Continued Stay Note  [facility]     Patient Name: Elza Arcos  MRN: [id number]  Today's Date: 6/2/2020    Admit Date: 6/1/2020    Discharge Plan     Row Name 06/02/20 1413       Plan    Plan  Infant to dc home with mother. CSW to follow for cord. Bridget Alva CSW    Plan Comments  Mother: Elza Arcos, MRN: [id number]. Infant: DianasGirl \"Apurva Jackie\" Isrreal, MRN: [id number]. CSW consult for, \"cord sent, late PNC.\" CSW spoke to mother in the room. Mother gave permission to speak in front of father of the baby Manual Torres. Infant sleeping in bassinet. CSW spoke with RN Mariteta Mancuso who stated no concerns at this time. Mother states she has crib, clothes, and diapers at home. Car seat was in the room. Mother appears to have support from father of the baby, Manual Torres, assisting her in the room and to the restroom prior to CSW assessment. Mother states she already has a pediatrician picked out at this time. CSW offered information on WIC, HANDS, transportation, counseling, and community resources via printed packet of information. CSW also left contact information if needs arise. Mother states plans to breastfeed. Mother transferred prenatal care around 20 weeks due to previous provider no longer accepting her insurance. CSW to follow for cord toxicology and report to CPS if warranted. Bridget Alva CSW.        Discharge Codes    No documentation.             Bridget Alva    "

## 2020-06-03 LAB
BH BB BLOOD EXPIRATION DATE: NORMAL
BH BB BLOOD EXPIRATION DATE: NORMAL
BH BB BLOOD TYPE BARCODE: 9500
BH BB BLOOD TYPE BARCODE: 9500
BH BB DISPENSE STATUS: NORMAL
BH BB DISPENSE STATUS: NORMAL
BH BB PRODUCT CODE: NORMAL
BH BB PRODUCT CODE: NORMAL
BH BB UNIT NUMBER: NORMAL
BH BB UNIT NUMBER: NORMAL
CROSSMATCH INTERPRETATION: NORMAL
CROSSMATCH INTERPRETATION: NORMAL
GLUCOSE BLDC GLUCOMTR-MCNC: 100 MG/DL (ref 70–130)
GLUCOSE BLDC GLUCOMTR-MCNC: 82 MG/DL (ref 70–130)
GLUCOSE BLDC GLUCOMTR-MCNC: 90 MG/DL (ref 70–130)
UNIT  ABO: NORMAL
UNIT  ABO: NORMAL
UNIT  RH: NORMAL
UNIT  RH: NORMAL

## 2020-06-03 PROCEDURE — 82962 GLUCOSE BLOOD TEST: CPT

## 2020-06-03 PROCEDURE — 99024 POSTOP FOLLOW-UP VISIT: CPT | Performed by: OBSTETRICS & GYNECOLOGY

## 2020-06-03 RX ADMIN — IBUPROFEN 800 MG: 800 TABLET ORAL at 13:07

## 2020-06-03 RX ADMIN — HYDROXYCHLOROQUINE SULFATE 200 MG: 200 TABLET, FILM COATED ORAL at 21:06

## 2020-06-03 RX ADMIN — Medication 1 TABLET: at 09:54

## 2020-06-03 RX ADMIN — METFORMIN HYDROCHLORIDE 500 MG: 500 TABLET, EXTENDED RELEASE ORAL at 12:21

## 2020-06-03 RX ADMIN — IBUPROFEN 800 MG: 800 TABLET ORAL at 21:05

## 2020-06-03 RX ADMIN — OXYCODONE AND ACETAMINOPHEN 2 TABLET: 5; 325 TABLET ORAL at 03:36

## 2020-06-03 RX ADMIN — FERROUS SULFATE TAB 325 MG (65 MG ELEMENTAL FE) 325 MG: 325 (65 FE) TAB at 09:54

## 2020-06-03 RX ADMIN — IBUPROFEN 800 MG: 800 TABLET ORAL at 03:36

## 2020-06-03 RX ADMIN — CHOLECALCIFEROL TAB 10 MCG (400 UNIT) 400 UNITS: 10 TAB at 09:54

## 2020-06-03 RX ADMIN — OXYCODONE AND ACETAMINOPHEN 2 TABLET: 5; 325 TABLET ORAL at 13:07

## 2020-06-03 RX ADMIN — OXYCODONE AND ACETAMINOPHEN 2 TABLET: 5; 325 TABLET ORAL at 19:53

## 2020-06-03 RX ADMIN — FOLIC ACID 1 MG: 1 TABLET ORAL at 09:54

## 2020-06-03 NOTE — LACTATION NOTE
P1. Patient called for  Help with her pump. She brought the [employer] and has an electric pump at home. Baby Is SGA and is IDDM . Her BGMs are being monitored and supplemental formula is given p.c.  Reviewed use and cleaning of the manual pump and recommended use after baby nurses. Patient is easy to express and has copious colostrum.  Baby latched easily with a deep , nutritive suckle and visible swallows. Will feed all EBM.

## 2020-06-03 NOTE — LACTATION NOTE
P1. Patient with hx of diabetes , lupus and PCOS. Baby is 5 lbs and has blood sugar issues . She receives formula p.c. And L.C. Suspects she is formula fed more than breast fed. Encouraged pateint to use her personal pump for breast stimulation and to feed infant any milk she obtains. Also to put baby to breast first for every feeding . Patient c/o burning with incision and will call  for help as needed.

## 2020-06-03 NOTE — NURSING NOTE
"Diabetes Education  Assessment/Teaching    Patient Name:  Elza Arcos  YOB: 1985  MRN: [id number]  Admit Date:  6/1/2020      Assessment Date:  6/3/2020    Most Recent Value   General Information    Referral From:  Database. Meet with 34 y/o at bedside.    Height  165.1 cm (65\")   Height Method  Stated   Weight  74.8 kg (164 lb 12.8 oz)   Weight Method  Standing scale   Diabetes History   What type of diabetes do you have?  Type 2   Have you had diabetes education/teaching in the past?  yes [This pt known to this RN from outpt ed here antepartum. ]   Do you test your blood sugar at home?  yes   Education Preferences   Barriers to Learning  --cultural/language. ie English as a second language.    Assessment Topics   Problem Solving - Assessment  Needs education   Healthy Coping - Assessment  Competent [supportive spouse here at bedside. ]   Monitoring - Assessment  Competent   DM Goals            Most Recent Value   DM Education Needs   Meter  Has own   Problem Solving  Hyperglycemia -advise pt of importance of BG control for healing post-op. Advise pt call OB MD for repeat high BG.    Healthy Coping  Appropriate   Discharge Plan  Home, Follow-up with MD   Motivation  Engaged   Teaching Method  Explanation, Discussion   Patient Response  Verbalized understanding, Needs reinforcement            Electronically signed by:  Angela Velazquez, RN, BSN, CDE   06/03/20 17:24  "

## 2020-06-03 NOTE — PROGRESS NOTES
DAILY PROGRESS NOTE    Patient Identification:  Name: Elza Arcos  Age: 35 y.o.  Sex: female  :  1985  MRN: [id number]               Subjective:  Interval History: Postoperative day 2 from a  delivery.  The patient is sore, but is otherwise feeling well.  Her lochia is minimal.  Pain is controlled with pain medication.    Objective:    Scheduled Meds:  cholecalciferol 400 Units Oral Daily   ferrous sulfate 325 mg Oral Daily With Breakfast   folic acid 1 mg Oral Daily   hydroxychloroquine 200 mg Oral Nightly   lansoprazole 30 mg Oral QAM   metFORMIN  mg Oral Daily With Lunch   prenatal (CLASSIC) vitamin 1 tablet Oral Daily     Continuous Infusions:  lactated ringers 125 mL/hr Last Rate: 125 mL/hr (20 0933)   lactated ringers 125 mL/hr Last Rate: 125 mL/hr (20 190)     PRN Meds:•  all purpose nipple ointment  •  diphenhydrAMINE **OR** diphenhydrAMINE **OR** diphenhydrAMINE  •  docusate sodium  •  Hydrocortisone (Perianal)  •  hydrOXYzine  •  ibuprofen  •  miSOPROStol  •  naloxone  •  oxyCODONE-acetaminophen  •  promethazine **OR** promethazine **OR** promethazine  •  simethicone    Vital signs in last 24 hours:  Temp:  [97.7 °F (36.5 °C)-99 °F (37.2 °C)] 97.7 °F (36.5 °C)  Heart Rate:  [] 86  Resp:  [16] 16  BP: (111-117)/(64-74) 111/64    Intake/Output:    Intake/Output Summary (Last 24 hours) at 6/3/2020 1132  Last data filed at 6/3/2020 0500  Gross per 24 hour   Intake 1150 ml   Output 3200 ml   Net -2050 ml       Exam:  General Appearance:    Alert, cooperative, no distress, appears stated age   Lungs:     Clear to auscultation bilaterally, respirations unlabored    Heart:    Regular rate and rhythm, S1 and S2 normal, no murmur, rub   or gallop   Abdomen:    Soft, nondistended.  The fundus is firm.  Pfannenstiel incision is well approximated.  Erythema and induration are absent.   Extremities:  Equal in size with 1+ bipedal edema   Skin:   Skin color, texture, turgor  normal, no rashes or lesions        Data Review:    Lab Results (last 24 hours)     Procedure Component Value Units Date/Time    POC Glucose Once [[id number]]  (Normal) Collected:  20 1046    Specimen:  Blood Updated:  20 1047     Glucose 100 mg/dL     POC Glucose Once [[id number]]  (Normal) Collected:  20 0835    Specimen:  Blood Updated:  20 0836     Glucose 90 mg/dL     POC Glucose Once [[id number]]  (Normal) Collected:  20 2236    Specimen:  Blood Updated:  20 2237     Glucose 88 mg/dL     POC Glucose Once [[id number]]  (Abnormal) Collected:  20 1516    Specimen:  Blood Updated:  20 1517     Glucose 137 mg/dL     POC Glucose Once [[id number]]  (Normal) Collected:  20 1134    Specimen:  Blood Updated:  20 1139     Glucose 105 mg/dL         Assessment:    S/P  section    GBS (group B Streptococcus carrier), +RV culture, currently pregnant    High-risk pregnancy in third trimester    Pre-existing type 2 diabetes mellitus during pregnancy in third trimester    Systemic lupus erythematosus affecting pregnancy in third trimester (CMS/HCC)    Anemia during pregnancy in third trimester    Positive Angie test    Elevated LFTs    1.  Appropriate progress, postoperative day #2  2.  The patient requests discharge tomorrow.        Raul Burton MD  6/3/2020

## 2020-06-04 LAB
GLUCOSE BLDC GLUCOMTR-MCNC: 77 MG/DL (ref 70–130)
GLUCOSE BLDC GLUCOMTR-MCNC: 82 MG/DL (ref 70–130)
GLUCOSE BLDC GLUCOMTR-MCNC: 87 MG/DL (ref 70–130)

## 2020-06-04 PROCEDURE — 99024 POSTOP FOLLOW-UP VISIT: CPT | Performed by: OBSTETRICS & GYNECOLOGY

## 2020-06-04 PROCEDURE — 86022 PLATELET ANTIBODIES: CPT

## 2020-06-04 PROCEDURE — 81112 HPA-15 GENOTYPING: CPT

## 2020-06-04 PROCEDURE — 81111 HPA-9 GENOTYPING: CPT

## 2020-06-04 PROCEDURE — 82962 GLUCOSE BLOOD TEST: CPT

## 2020-06-04 PROCEDURE — 81105 HPA-1 GENOTYPING: CPT | Performed by: PEDIATRICS

## 2020-06-04 PROCEDURE — 81107 HPA-3 GENOTYPING: CPT

## 2020-06-04 PROCEDURE — 81110 HPA-6 GENOTYPING: CPT

## 2020-06-04 PROCEDURE — 81109 HPA-5 GENOTYPING: CPT

## 2020-06-04 PROCEDURE — 81108 HPA-4 GENOTYPING: CPT

## 2020-06-04 PROCEDURE — 81106 HPA-2 GENOTYPING: CPT

## 2020-06-04 RX ORDER — PSEUDOEPHEDRINE HCL 30 MG
100 TABLET ORAL 2 TIMES DAILY
Qty: 60 EACH | Refills: 0 | Status: SHIPPED | OUTPATIENT
Start: 2020-06-04 | End: 2020-06-16

## 2020-06-04 RX ORDER — OXYCODONE HYDROCHLORIDE AND ACETAMINOPHEN 5; 325 MG/1; MG/1
1-2 TABLET ORAL EVERY 4 HOURS PRN
Qty: 30 TABLET | Refills: 0 | Status: SHIPPED | OUTPATIENT
Start: 2020-06-04 | End: 2020-06-11

## 2020-06-04 RX ORDER — IBUPROFEN 800 MG/1
800 TABLET ORAL EVERY 8 HOURS PRN
Qty: 30 TABLET | Refills: 0 | Status: SHIPPED | OUTPATIENT
Start: 2020-06-04 | End: 2020-06-05

## 2020-06-04 RX ADMIN — IBUPROFEN 800 MG: 800 TABLET ORAL at 04:46

## 2020-06-04 RX ADMIN — IBUPROFEN 800 MG: 800 TABLET ORAL at 18:05

## 2020-06-04 RX ADMIN — HYDROXYCHLOROQUINE SULFATE 200 MG: 200 TABLET, FILM COATED ORAL at 20:51

## 2020-06-04 RX ADMIN — DOCUSATE SODIUM 100 MG: 100 CAPSULE, LIQUID FILLED ORAL at 10:36

## 2020-06-04 RX ADMIN — OXYCODONE AND ACETAMINOPHEN 2 TABLET: 5; 325 TABLET ORAL at 04:46

## 2020-06-04 RX ADMIN — METFORMIN HYDROCHLORIDE 500 MG: 500 TABLET, EXTENDED RELEASE ORAL at 12:00

## 2020-06-04 RX ADMIN — OXYCODONE AND ACETAMINOPHEN 2 TABLET: 5; 325 TABLET ORAL at 10:38

## 2020-06-04 RX ADMIN — FERROUS SULFATE TAB 325 MG (65 MG ELEMENTAL FE) 325 MG: 325 (65 FE) TAB at 09:45

## 2020-06-04 RX ADMIN — OXYCODONE AND ACETAMINOPHEN 2 TABLET: 5; 325 TABLET ORAL at 18:05

## 2020-06-04 RX ADMIN — SIMETHICONE CHEW TAB 80 MG 80 MG: 80 TABLET ORAL at 10:36

## 2020-06-04 RX ADMIN — Medication 1 TABLET: at 09:45

## 2020-06-04 RX ADMIN — DOCUSATE SODIUM 100 MG: 100 CAPSULE, LIQUID FILLED ORAL at 20:54

## 2020-06-04 RX ADMIN — FOLIC ACID 1 MG: 1 TABLET ORAL at 09:46

## 2020-06-04 RX ADMIN — CHOLECALCIFEROL TAB 10 MCG (400 UNIT) 400 UNITS: 10 TAB at 09:46

## 2020-06-04 NOTE — LACTATION NOTE
Patient is engorged today. Breasts are painfully full and firm and hot to touch. She is using her manual pump to soften breast for easier latch. Comfort measures for engorgement explained. She has a headache as well which can also be due to engorgement.  Will call for LC as needed.  Lactation Consult Note    Evaluation Completed: 2020 10:40  Patient Name: Elza Arcos  :  1985  MRN:  [id number]     REFERRAL  INFORMATION:                          Date of Referral: 20   Person Making Referral: nurse  Maternal Reason for Referral: breastfeeding currently, engorgement  Infant Reason for Referral: low birth weight    DELIVERY HISTORY:          Skin to skin initiation date/time: 2020  11:46 AM   Skin to skin end date/time:              MATERNAL ASSESSMENT:  Breast Size Issue: none (20 1000 : Brandy Starkey RN)  Breast Shape: pendulous (20 1000 : Brandy Starkey RN)  Breast Density: engorged (20 1000 : Brandy Starkey RN)     Nipples: everted (20 1000 : Brandy Starkey RN)                INFANT ASSESSMENT:  Information for the patient's :  Dutch ArcosTracy [[id number]]   No past medical history on file.                                                                                                                                MATERNAL INFANT FEEDING:  Maternal Preparation: breast care, hand hygiene (20 1000 : Brandy Starkey RN)  Maternal Emotional State: independent (20 1000 : Brandy Starkey RN)                  Comfort Measures Before/During Feeding: other (see comments)(pumped with her manual pump and got 4cc colostrum to feed to) (20 1000 : Brandy Starkey, RN)     Comfort Measures Following Feeding: air-drying encouraged, expressed milk applied, other (see comments)(comfort measures for engorgement) (20 1000 : Brandy Starkey RN)                                        EQUIPMENT TYPE:  Breast Pump  Type: manual (06/04/20 1000 : Brandy Starkey, RN)  Breast Pump Flange Type: hard (06/04/20 1000 : Brandy Starkey, RN)  Breast Pump Flange Size: 24 mm, 27 mm (06/04/20 1000 : Brandy Starkey, RN)    Breast Care: Breastfeeding: breast milk to nipples, lanolin to nipples, manual expression to soften breast, open to air, supportive bra utilized, warm shower encouraged, other (see comments)(cool compresses) (06/04/20 1000 : Brandy Starkey, RN)                   BREAST PUMPING:          LACTATION REFERRALS:

## 2020-06-04 NOTE — LACTATION NOTE
Patient agreed to HGP. [facility] fixed over the shoulder ice packs and patient was much relieved. Will pump again but encouraged to get baby back at breast and use pump only if needed.

## 2020-06-04 NOTE — LACTATION NOTE
Patient is feeling full effect of engorgement with headache and general discomfort. She is in too much pain to put baby to breast and is using her manual pump but not getting good drainage of milk . RN is asking patient if she will consider using the HGP for relief as baby is not latching.Lactation Consult Note    Evaluation Completed: 2020 18:06  Patient Name: Elza Arcos  :  1985  MRN:  [id number]     REFERRAL  INFORMATION:                          Date of Referral: 20   Person Making Referral: nurse  Maternal Reason for Referral: breastfeeding currently, engorgement  Infant Reason for Referral: low birth weight    DELIVERY HISTORY:          Skin to skin initiation date/time: 2020  11:46 AM   Skin to skin end date/time:              MATERNAL ASSESSMENT:  Breast Size Issue: none (20 : Brandy Starkey RN)  Breast Shape: pendulous, round (20 : Brandy Starkey RN)  Breast Density: engorged (20 : Brandy Starkey RN)  Areola: firm (20 : Brandy Starkey RN)  Nipples: everted (20 : Brandy Starkey RN)                INFANT ASSESSMENT:  Information for the patient's :  Dutch ArcosTracy [[id number]]   No past medical history on file.                                                                                                                                MATERNAL INFANT FEEDING:  Maternal Preparation: breast care (20 : Brandy Starkey RN)  Maternal Emotional State: other (see comments)(in pain from engorgement , headache , hot ) (20 : Brandy Starkey RN)                  Comfort Measures Before/During Feeding: other (see comments)(pumped with her manual pump and got 4cc colostrum to feed to) (20 1000 : Brandy Starkey RN)  Milk Ejection Reflex: present (20 : Brandy Starkey RN)  Comfort Measures Following Feeding: air-drying encouraged, expressed milk  applied, other (see comments)(comfort measures for engorgement) (06/04/20 1000 : Brandy Starkey, RN)        Latch Assistance: no(patient declines due to pain) (06/04/20 1757 : Brandy Starkey, RN)                               EQUIPMENT TYPE:  Breast Pump Type: manual (06/04/20 1757 : Brandy Starkey, RN)  Breast Pump Flange Type: hard (06/04/20 1757 : Brandy Starkey, RN)  Breast Pump Flange Size: 24 mm (06/04/20 1757 : Brandy Starkey, RN)    Breast Care: Breastfeeding: breast pads utilized, open to air, warm shower encouraged, other (see comments)(cold compresses) (06/04/20 1757 : Brandy Starkey, RN)  Breastfeeding Assistance: manual breast pump utilized, feeding cue recognition promoted (06/04/20 1757 : Brandy Starkey, RN)     Breastfeeding Support: diary/feeding log utilized, encouragement provided, lactation counseling provided, maternal hydration promoted, maternal rest encouraged (06/04/20 1757 : rBandy Starkey, RN)          BREAST PUMPING:  Breast Pumping Interventions: other (see comments)(Elza does not want to latch baby due to pain ) (06/04/20 1757 : Brandy Starkey, RN)       LACTATION REFERRALS:

## 2020-06-04 NOTE — NURSING NOTE
Spoke with Dr. Oliveira regarding lab request by [facility]),  - low platelets.  Lab requiring a 30cc blood draw from pt..  Alloimmune Thrombocytopenia panel.  reviewed pts currents labs as well as reason for blood draw with Dr. Oliveira via phone. Ok to go ahead with labs per Dr. Oliveira.

## 2020-06-04 NOTE — PLAN OF CARE
Problem: Patient Care Overview  Goal: Plan of Care Review  Outcome: Ongoing (interventions implemented as appropriate)  Flowsheets (Taken 2020 4831)  Progress: improving  Plan of Care Reviewed With: patient  Outcome Summary: Pt doing well. VSS. Pain controlled with PO Motrin and Percocet. Incision CDI. Bottlefeeding. No concerns at this time, anticipate D/C today. Will continue to monitor.  Goal: Individualization and Mutuality  Outcome: Ongoing (interventions implemented as appropriate)  Goal: Discharge Needs Assessment  Outcome: Ongoing (interventions implemented as appropriate)  Goal: Interprofessional Rounds/Family Conf  Outcome: Ongoing (interventions implemented as appropriate)     Problem: Fall Risk,  (Adult,Obstetrics,Pediatric)  Goal: Identify Related Risk Factors and Signs and Symptoms  Outcome: Ongoing (interventions implemented as appropriate)  Goal: Absence of Maternal Fall  Outcome: Ongoing (interventions implemented as appropriate)  Goal: Absence of  Fall/Drop  Outcome: Ongoing (interventions implemented as appropriate)     Problem: Skin Injury Risk (Adult)  Goal: Identify Related Risk Factors and Signs and Symptoms  Outcome: Ongoing (interventions implemented as appropriate)  Goal: Skin Health and Integrity  Outcome: Ongoing (interventions implemented as appropriate)     Problem: Anesthesia/Analgesia, Neuraxial (Obstetrics)  Goal: Signs and Symptoms of Listed Potential Problems Will be Absent, Minimized or Managed (Anesthesia/Analgesia, Neuraxial)  Outcome: Ongoing (interventions implemented as appropriate)     Problem: Postpartum ( Delivery) (Adult,Obstetrics,Pediatric)  Goal: Signs and Symptoms of Listed Potential Problems Will be Absent, Minimized or Managed (Postpartum)  Outcome: Ongoing (interventions implemented as appropriate)  Goal: Anesthesia/Sedation Recovery  Outcome: Ongoing (interventions implemented as appropriate)     Problem: Pain, Chronic (Adult)  Goal:  Identify Related Risk Factors and Signs and Symptoms  Outcome: Ongoing (interventions implemented as appropriate)  Goal: Acceptable Pain/Comfort Level and Functional Ability  Outcome: Ongoing (interventions implemented as appropriate)     Problem: Breastfeeding (Adult,Obstetrics,Pediatric)  Goal: Signs and Symptoms of Listed Potential Problems Will be Absent, Minimized or Managed (Breastfeeding)  Outcome: Ongoing (interventions implemented as appropriate)

## 2020-06-04 NOTE — DISCHARGE SUMMARY
Date of Discharge:  2020    Discharge Diagnosis: 39 week IUP with SLE, AODM and anemia- delivered    Presenting Problem/History of Present Illness  Active Hospital Problems    Diagnosis  POA   • **S/P  section [Z98.891]  Not Applicable   • High-risk pregnancy in third trimester [O09.93]  Yes   • Pre-existing type 2 diabetes mellitus during pregnancy in third trimester [O24.113]  Yes   • Systemic lupus erythematosus affecting pregnancy in third trimester (CMS/HCC) [O99.89, M32.9]  Yes   • Anemia during pregnancy in third trimester [O99.013]  Yes   • Positive Angie test [R76.8]  Yes   • Elevated LFTs [R94.5]  Yes   • GBS (group B Streptococcus carrier), +RV culture, currently pregnant [O99.820]  Not Applicable      Resolved Hospital Problems   No resolved problems to display.          Hospital Course  Patient is a 35 y.o. female presented with high risk pregnancy at term for primary C/S.  She delivered a VFI -.  Post-op course uneventful.  Discharge milestones reached.    Procedures Performed    Procedure(s):   SECTION PRIMARY  -------------------       Consults:   Consults     No orders found from 5/3/2020 to 2020.          Pertinent Test Results: O pos    Condition on Discharge:  stable    Vital Signs  Temp:  [98.1 °F (36.7 °C)-98.8 °F (37.1 °C)] 98.1 °F (36.7 °C)  Heart Rate:  [74-80] 80  Resp:  [16-18] 18  BP: (112-116)/(68-74) 112/74    Physical Exam:   Abd soft, NT.  Incision clean.  Ext 1+ edema bilat without tenderness.    Discharge Disposition  Home or Self Care    Discharge Medications     Discharge Medications      New Medications      Instructions Start Date   docusate sodium 100 MG capsule   100 mg, Oral, 2 Times Daily      ibuprofen 800 MG tablet  Commonly known as:  ADVIL,MOTRIN   800 mg, Oral, Every 8 Hours PRN      oxyCODONE-acetaminophen 5-325 MG per tablet  Commonly known as:  PERCOCET   1-2 tablets, Oral, Every 4 Hours PRN         Continue These Medications       Instructions Start Date   Blood Glucose Test strip   1 each, In Vitro, 4 Times Daily, Test fasting and 2 hours after meals      ferrous sulfate 325 (65 Fe) MG tablet   Oral      folic acid 1 MG tablet  Commonly known as:  FOLVITE   1 mg, Oral, Daily      FreeStyle Lite device   Check blood sugar 4 x daily (fasting and 2 hours after breakfast, lunch and dinner)      hydroxychloroquine 200 MG tablet  Commonly known as:  PLAQUENIL   TK 1 AND 1/2 TS PO D. START 1 T D FOR FIRST 7 DAYS      Ketone Test strip   QAM BEFORE BREAKFAST TEST IN THE MORNING FOR KETONES IN URINE      Lancets misc   1 each, Does not apply, 4 Times Daily, Use to test fasting and 2 hours after meals      metFORMIN  MG 24 hr tablet  Commonly known as:  GLUCOPHAGE-XR   500 mg, Oral, Daily      omeprazole 40 MG capsule  Commonly known as:  priLOSEC   40 mg, Oral, Daily      ondansetron 4 MG tablet  Commonly known as:  ZOFRAN   4 mg, Oral      PrePLUS 27-1 MG tablet   1 tablet, Oral, Daily      Urine Glucose-Ketones Test strip   1 each, In Vitro, Every Morning Before Breakfast, Test in morning for ketones in urine      vitamin d 125 MCG (5000 UT) capsule  Commonly known as:  CHOLECALIFEROL   5,000 Units, Oral, Weekly             Discharge Diet:   Diet Instructions     Diet: Consistent Carbohydrate      Discharge Diet:  Consistent Carbohydrate          Activity at Discharge:   Activity Instructions     Activity as Tolerated      Pelvic Rest            Follow-up Appointments  Future Appointments   Date Time Provider Department Center   6/16/2020 12:45 PM Tor López MD [facility] None     Additional Instructions for the Follow-ups that You Need to Schedule     Discharge Follow-up with Specified Provider: Dr López; 2 Weeks   As directed      To:  Dr López    Follow Up:  2 Weeks               Test Results Pending at Discharge       Rishi Oliveira MD  06/04/20  11:05    Time: Discharge 25 min

## 2020-06-05 VITALS
SYSTOLIC BLOOD PRESSURE: 110 MMHG | BODY MASS INDEX: 27.46 KG/M2 | WEIGHT: 164.8 LBS | DIASTOLIC BLOOD PRESSURE: 68 MMHG | HEIGHT: 65 IN | RESPIRATION RATE: 16 BRPM | HEART RATE: 71 BPM | TEMPERATURE: 97.9 F | OXYGEN SATURATION: 100 %

## 2020-06-05 LAB
GLUCOSE BLDC GLUCOMTR-MCNC: 74 MG/DL (ref 70–130)
GLUCOSE BLDC GLUCOMTR-MCNC: 86 MG/DL (ref 70–130)

## 2020-06-05 PROCEDURE — 82962 GLUCOSE BLOOD TEST: CPT

## 2020-06-05 RX ORDER — OXYCODONE HYDROCHLORIDE AND ACETAMINOPHEN 5; 325 MG/1; MG/1
1 TABLET ORAL EVERY 6 HOURS PRN
Qty: 16 TABLET | Refills: 0 | Status: SHIPPED | OUTPATIENT
Start: 2020-06-05 | End: 2020-06-16

## 2020-06-05 RX ORDER — IBUPROFEN 600 MG/1
600 TABLET ORAL EVERY 6 HOURS PRN
Qty: 30 TABLET | Refills: 0 | Status: SHIPPED | OUTPATIENT
Start: 2020-06-05 | End: 2020-07-14

## 2020-06-05 RX ORDER — DOCUSATE SODIUM 100 MG/1
100 CAPSULE, LIQUID FILLED ORAL 2 TIMES DAILY
Qty: 20 CAPSULE | Refills: 0 | Status: SHIPPED | OUTPATIENT
Start: 2020-06-05

## 2020-06-05 RX ADMIN — FOLIC ACID 1 MG: 1 TABLET ORAL at 08:06

## 2020-06-05 RX ADMIN — SIMETHICONE CHEW TAB 80 MG 80 MG: 80 TABLET ORAL at 10:00

## 2020-06-05 RX ADMIN — Medication 1 TABLET: at 08:06

## 2020-06-05 RX ADMIN — CHOLECALCIFEROL TAB 10 MCG (400 UNIT) 400 UNITS: 10 TAB at 08:06

## 2020-06-05 RX ADMIN — DOCUSATE SODIUM 100 MG: 100 CAPSULE, LIQUID FILLED ORAL at 16:26

## 2020-06-05 RX ADMIN — IBUPROFEN 800 MG: 800 TABLET ORAL at 03:41

## 2020-06-05 RX ADMIN — OXYCODONE AND ACETAMINOPHEN 2 TABLET: 5; 325 TABLET ORAL at 10:00

## 2020-06-05 RX ADMIN — FERROUS SULFATE TAB 325 MG (65 MG ELEMENTAL FE) 325 MG: 325 (65 FE) TAB at 07:52

## 2020-06-05 RX ADMIN — OXYCODONE AND ACETAMINOPHEN 2 TABLET: 5; 325 TABLET ORAL at 03:41

## 2020-06-05 RX ADMIN — METFORMIN HYDROCHLORIDE 500 MG: 500 TABLET, EXTENDED RELEASE ORAL at 13:10

## 2020-06-05 RX ADMIN — OXYCODONE AND ACETAMINOPHEN 2 TABLET: 5; 325 TABLET ORAL at 16:26

## 2020-06-05 NOTE — PLAN OF CARE
Postoperative day 4. Surgical site open to air, clean, dry and intact. No signs and symptoms of infection noted at this time. Breast engorged throughout the day, full upon assessment. Patient encouraged to pump every 3 hours along with breastfeeding. Warm shower encouraged, cold compression used as well. No complaints of pain at this time, as needed medications available per MD order. Lochia scant rubra. Vital signs within normal limits at this time. Edema noted to bilateral lower extremities, +1 to legs slightly pitting, +2 to ankle to feet with pitting. Patient encouraged to walk in the hallways frequently. Complaints of shortness of breath. No signs and symptoms of hypoglycemia noted at this time. Steady gait. Will continue to assess.     Problem: Patient Care Overview  Goal: Plan of Care Review  Outcome: Ongoing (interventions implemented as appropriate)  Flowsheets  Taken 6/5/2020 0305 by Phylicia Colby, RN  Progress: improving  Taken 6/4/2020 0318 by Dayana Rodriguez RN  Plan of Care Reviewed With: patient

## 2020-06-05 NOTE — LACTATION NOTE
Educated mom on baby's expected output and weight gain  Lactation Consult Note    Evaluation Completed: 2020 09:27  Patient Name: Elza Arcos  :  1985  MRN:  [id number]     REFERRAL  INFORMATION:                          Date of Referral: 20   Person Making Referral: nurse  Maternal Reason for Referral: breastfeeding currently, engorgement  Infant Reason for Referral: low birth weight    DELIVERY HISTORY:          Skin to skin initiation date/time: 2020  11:46 AM   Skin to skin end date/time:              MATERNAL ASSESSMENT:                               INFANT ASSESSMENT:  Information for the patient's :  IsrraelTessie [[id number]]   No past medical history on file.                                                                                                                                MATERNAL INFANT FEEDING:                                                                       EQUIPMENT TYPE:                                 BREAST PUMPING:          LACTATION REFERRALS:

## 2020-06-05 NOTE — LACTATION NOTE
Mom reports baby is nursing well and her milk is in and she is pumping about one ounce. Mom denies questions at this time. Encouraged to call if needing assistance. Gave [facility]/zoom and mommy and me info  Lactation Consult Note    Evaluation Completed: 2020 09:24  Patient Name: Elza Arcos  :  1985  MRN:  [id number]     REFERRAL  INFORMATION:                          Date of Referral: 20   Person Making Referral: nurse  Maternal Reason for Referral: breastfeeding currently, engorgement  Infant Reason for Referral: low birth weight    DELIVERY HISTORY:          Skin to skin initiation date/time: 2020  11:46 AM   Skin to skin end date/time:              MATERNAL ASSESSMENT:                               INFANT ASSESSMENT:  Information for the patient's :  IsrraelTessie [[id number]]   No past medical history on file.                                                                                                                                MATERNAL INFANT FEEDING:                                                                       EQUIPMENT TYPE:                                 BREAST PUMPING:          LACTATION REFERRALS:

## 2020-06-05 NOTE — PROGRESS NOTES
DAILY PROGRESS NOTE    Patient Identification:  Name: Elza Arcos  Age: 35 y.o.  Sex: female  :  1985  MRN: [id number]               Subjective:  Interval History: Postoperative day #4 from a  delivery.  Patient is feeling well.  She reports that her lochia is minimal.  Pain is well controlled with pain medications.  She was originally discharged yesterday, but elected to stay because the baby had some additional testing last night.  She would like to be discharged today.    Objective:    Scheduled Meds:  cholecalciferol 400 Units Oral Daily   ferrous sulfate 325 mg Oral Daily With Breakfast   folic acid 1 mg Oral Daily   hydroxychloroquine 200 mg Oral Nightly   lansoprazole 30 mg Oral QAM   metFORMIN  mg Oral Daily With Lunch   prenatal (CLASSIC) vitamin 1 tablet Oral Daily     Continuous Infusions:  lactated ringers 125 mL/hr Last Rate: 125 mL/hr (20 0933)   lactated ringers 125 mL/hr Last Rate: 125 mL/hr (20 190)     PRN Meds:•  all purpose nipple ointment  •  diphenhydrAMINE **OR** diphenhydrAMINE **OR** diphenhydrAMINE  •  docusate sodium  •  Hydrocortisone (Perianal)  •  hydrOXYzine  •  ibuprofen  •  miSOPROStol  •  naloxone  •  oxyCODONE-acetaminophen  •  promethazine **OR** promethazine **OR** promethazine  •  simethicone    Vital signs in last 24 hours:  Temp:  [97.9 °F (36.6 °C)-98.6 °F (37 °C)] 97.9 °F (36.6 °C)  Heart Rate:  [71-91] 71  Resp:  [16-18] 16  BP: (110-131)/(68-80) 110/68    Intake/Output:  No intake or output data in the 24 hours ending 20 1451    Exam:  General Appearance:    Alert, cooperative, no distress, appears stated age   Lungs:     Clear to auscultation bilaterally, respirations unlabored    Heart:    Regular rate and rhythm, S1 and S2 normal, no murmur, rub   or gallop   Abdomen:    Soft, nondistended.  The fundus is firm and nontender.  Pfannenstiel incision is well approximated.  Erythema and induration are absent.   Extremities:   Equal in size with 1+ bipedal edema   Skin:   Skin color, texture, turgor normal, no rashes or lesions        Data Review:    Lab Results (last 24 hours)     Procedure Component Value Units Date/Time    POC Glucose Once [[id number]]  (Normal) Collected:  20 1038    Specimen:  Blood Updated:  20 1039     Glucose 86 mg/dL     POC Glucose Once [[id number]]  (Normal) Collected:  20 0742    Specimen:  Blood Updated:  20 0744     Glucose 74 mg/dL     POC Glucose Once [[id number]]  (Normal) Collected:  208    Specimen:  Blood Updated:  20 2209     Glucose 87 mg/dL      Alloimmune Thrombocytopenia Panel - Miscellaneous Test [[id number]] Collected:  20 1517    Specimen:  Blood Updated:  20 1550    POC Glucose Once [[id number]]  (Normal) Collected:  20 1518    Specimen:  Blood Updated:  20 1520     Glucose 77 mg/dL         Assessment:    S/P  section    GBS (group B Streptococcus carrier), +RV culture, currently pregnant    High-risk pregnancy in third trimester    Pre-existing type 2 diabetes mellitus during pregnancy in third trimester    Systemic lupus erythematosus affecting pregnancy in third trimester (CMS/HCC)    Anemia during pregnancy in third trimester    Positive Angie test    Elevated LFTs    1.  Appropriate progress, postoperative day #4  2.  Discharge today.  Discharge counseling was undertaken and the patient's questions were answered.  Prescriptions were sent by Dr. Oliveira yesterday.  The patient will follow-up in 2 weeks for a checkup.  She has been instructed to call for fever, chills, severe abdominal pain or any other concerns.        Raul Burton MD  2020

## 2020-06-05 NOTE — PROGRESS NOTES
"Continued Stay Note  [facility]     Patient Name: Elza Arcos  MRN: [id number]  Today's Date: 6/5/2020    Admit Date: 6/1/2020    Discharge Plan     Row Name 06/05/20 1430       Plan    Plan  Infant to discharge home with mother when medically ready. SINCERE Sanabria    Plan Comments  Mother: Elza Arcos, MRN: [id number]. Infant: DiamaureenGirl \"Apurva Mejia\" Isrrael, MRN: [id number]. CSW reviewed cord toxicology results, which are negative. Referral to CPS is not warranted at this time. No other needs or concerns noted at this time. SINCERE Sanabria        Discharge Codes    No documentation.       Expected Discharge Date and Time     Expected Discharge Date Expected Discharge Time    Jun 4, 2020             YUE Sanabria    "

## 2020-06-08 ENCOUNTER — TELEPHONE (OUTPATIENT)
Dept: OBSTETRICS AND GYNECOLOGY | Facility: CLINIC | Age: 35
End: 2020-06-08

## 2020-06-08 RX ORDER — LANCETS 30 GAUGE
1 EACH MISCELLANEOUS 4 TIMES DAILY
Qty: 120 EACH | Refills: 3 | Status: SHIPPED | OUTPATIENT
Start: 2020-06-08

## 2020-06-08 NOTE — TELEPHONE ENCOUNTER
Constance,     I sent in e-Rx for lancets as requested.   Let the pharmacy know.     Thanks   Dr. López

## 2020-06-09 ENCOUNTER — TELEPHONE (OUTPATIENT)
Dept: LACTATION | Facility: HOSPITAL | Age: 35
End: 2020-06-09

## 2020-06-10 LAB
PANAGGLUTINATION: NORMAL
WARM AUTOANTIBODY: NORMAL

## 2020-06-16 ENCOUNTER — OFFICE VISIT (OUTPATIENT)
Dept: OBSTETRICS AND GYNECOLOGY | Facility: CLINIC | Age: 35
End: 2020-06-16

## 2020-06-16 VITALS — HEIGHT: 65 IN | WEIGHT: 150 LBS | BODY MASS INDEX: 24.99 KG/M2

## 2020-06-16 DIAGNOSIS — Z98.890 POST-OPERATIVE STATE: Primary | ICD-10-CM

## 2020-06-16 DIAGNOSIS — O99.891 SYSTEMIC LUPUS ERYTHEMATOSUS AFFECTING PREGNANCY IN THIRD TRIMESTER (HCC): ICD-10-CM

## 2020-06-16 DIAGNOSIS — O24.113 PRE-EXISTING TYPE 2 DIABETES MELLITUS DURING PREGNANCY IN THIRD TRIMESTER: ICD-10-CM

## 2020-06-16 DIAGNOSIS — M32.9 SYSTEMIC LUPUS ERYTHEMATOSUS AFFECTING PREGNANCY IN THIRD TRIMESTER (HCC): ICD-10-CM

## 2020-06-16 PROCEDURE — 0503F POSTPARTUM CARE VISIT: CPT | Performed by: OBSTETRICS & GYNECOLOGY

## 2020-06-16 RX ORDER — METFORMIN HYDROCHLORIDE 500 MG/1
500 TABLET, EXTENDED RELEASE ORAL
COMMUNITY
End: 2020-06-29 | Stop reason: SDUPTHER

## 2020-06-16 NOTE — PROGRESS NOTES
"Subjective   Elza Arcos is a 35 y.o. female here for Post op exam. Pt is s/p  on 2020. Pt still having mild pain.     History of Present Illness     35 y.o.    S/P  2 weeks ago.   No major issues. Reports BS are good.       Review of Systems   Constitutional: Negative for fever.   Gastrointestinal: Negative for abdominal pain.   Genitourinary: Negative for dysuria, pelvic pain and vaginal bleeding.   All other systems reviewed and are negative.      Objective    Ht 165.1 cm (65\")   Wt 68 kg (150 lb)   Breastfeeding Yes   BMI 24.96 kg/m²   Physical Exam   Constitutional: She appears well-developed and well-nourished. No distress.   Abdominal: Soft. Bowel sounds are normal. She exhibits no distension (incision - clean, dry and intact ). There is no tenderness.   Musculoskeletal: Normal range of motion. She exhibits no edema.   Skin: Skin is warm and dry. No rash noted. No erythema.   Psychiatric: She has a normal mood and affect. Her behavior is normal.         Assessment/Plan   Problems Addressed this Visit        Endocrine    Pre-existing type 2 diabetes mellitus during pregnancy in third trimester    Relevant Medications    metFORMIN ER (GLUCOPHAGE-XR) 500 MG 24 hr tablet       Immune and Lymphatic    Systemic lupus erythematosus affecting pregnancy in third trimester (CMS/[facility])      Other Visit Diagnoses     Post-operative state    -  Primary        1) Post op    Continue pelvic rest  Continue no heavy lifting.     2) Multiple other medical conditions.   Reports feeling well overall - SLE, Type II DM on metformin   Okay to continue current care for now, will have to assess need for primary care MD with postpartum check.     Tor López MD  2020  13:33             "

## 2020-06-22 ENCOUNTER — TELEPHONE (OUTPATIENT)
Dept: OBSTETRICS AND GYNECOLOGY | Facility: CLINIC | Age: 35
End: 2020-06-22

## 2020-06-22 NOTE — TELEPHONE ENCOUNTER
----- Message from Brit Shah sent at 6/22/2020 11:15 AM EDT -----  REFILL REQUEST KETONE TEST STRIPS

## 2020-06-25 ENCOUNTER — TELEPHONE (OUTPATIENT)
Dept: OBSTETRICS AND GYNECOLOGY | Facility: CLINIC | Age: 35
End: 2020-06-25

## 2020-06-25 LAB — REF LAB TEST RESULTS: NORMAL

## 2020-06-25 NOTE — TELEPHONE ENCOUNTER
----- Message from Tor López MD sent at 2020 11:32 AM EDT -----  Hui, antibodies negative for  alloimmune thrombocytopenia. Thanks, Dr. López

## 2020-06-29 ENCOUNTER — TELEPHONE (OUTPATIENT)
Dept: OBSTETRICS AND GYNECOLOGY | Facility: CLINIC | Age: 35
End: 2020-06-29

## 2020-06-29 RX ORDER — METFORMIN HYDROCHLORIDE 500 MG/1
500 TABLET, EXTENDED RELEASE ORAL
Qty: 30 TABLET | Refills: 2 | Status: SHIPPED | OUTPATIENT
Start: 2020-06-29

## 2020-07-14 ENCOUNTER — POSTPARTUM VISIT (OUTPATIENT)
Dept: OBSTETRICS AND GYNECOLOGY | Facility: CLINIC | Age: 35
End: 2020-07-14

## 2020-07-14 VITALS
SYSTOLIC BLOOD PRESSURE: 123 MMHG | WEIGHT: 153 LBS | HEART RATE: 88 BPM | BODY MASS INDEX: 25.49 KG/M2 | HEIGHT: 65 IN | DIASTOLIC BLOOD PRESSURE: 80 MMHG

## 2020-07-14 DIAGNOSIS — Z12.4 SCREENING FOR CERVICAL CANCER: ICD-10-CM

## 2020-07-14 PROCEDURE — 99213 OFFICE O/P EST LOW 20 MIN: CPT | Performed by: OBSTETRICS & GYNECOLOGY

## 2020-07-14 NOTE — PROGRESS NOTES
"Here for Postpartum Check     HPI    35 y.o.  - Delivered 2020, now at 6 weeks postpartum for follow up.   Complications of pregnancy/delivery/postpartum : DM, , SGA  Breastfeeding/Bottlefeeding : Breast/Bottle  Baby Blues: denies  Contraception : declines  Last pap : record not available  Complaints: denies    Review of Systems   Constitutional: Negative for chills and fever.   Gastrointestinal: Negative for abdominal pain.   Genitourinary: Negative for dysuria, menstrual problem, pelvic pain, vaginal bleeding and vaginal discharge.   All other systems reviewed and are negative.      /80   Pulse 88   Ht 165.1 cm (65\")   Wt 69.4 kg (153 lb)   Breastfeeding Yes   BMI 25.46 kg/m²     Physical Exam   Constitutional: She is oriented to person, place, and time. She appears well-developed and well-nourished. No distress. She is not obese.  HENT:   Head: Normocephalic and atraumatic.   Eyes: Conjunctivae are normal. Right eye exhibits no discharge. Left eye exhibits no discharge.   Neck: Normal range of motion. Neck supple. No thyromegaly present.   Cardiovascular: Normal rate, regular rhythm and normal heart sounds.   No murmur heard.  Pulmonary/Chest: Effort normal and breath sounds normal. No respiratory distress. Right breast exhibits no inverted nipple, no mass and no nipple discharge. Left breast exhibits no inverted nipple, no mass and no nipple discharge.   Abdominal: Soft. Bowel sounds are normal. She exhibits no distension (incision - clean, dry and intact ). There is no tenderness.   Genitourinary: Vagina normal and cervix normal. Pelvic exam was performed with patient supine. There is no lesion or Bartholin's cyst on the right labia. There is no lesion or Bartholin's cyst on the left labia. Uterus is anteverted. Uterus is not deviated, enlarged, fixed or exhibiting a mass. Cervix does not exhibit motion tenderness or friability. Right adnexum displays no mass, no tenderness and " no fullness. Left adnexum displays no mass, no tenderness and no fullness. No bleeding in the vagina. No vaginal discharge found.   Musculoskeletal: Normal range of motion. She exhibits no edema.   Lymphadenopathy:     She has no cervical adenopathy.        Right: No inguinal adenopathy present.        Left: No inguinal adenopathy present.   Neurological: She is alert and oriented to person, place, and time.   Skin: Skin is warm and dry. No rash noted.   Psychiatric: She has a normal mood and affect. Her behavior is normal. Judgment and thought content normal.         Assessment/plan   Problems Addressed this Visit     None      Visit Diagnoses     Postpartum state    -  Primary    Screening for cervical cancer        Relevant Orders    PapIG, HPV, Rfx 16 / 18        1) Postpartum   Released from restrictions   No baby blues   Reviewed contraceptive options she is unsure   Recommendation : 150 min/week of moderate intensity aerobic activity     2) Multiple medical issues,   Encouraged PCP for long term follow up     3) Pap screening done       Tor López MD   7/14/2020  14:06

## 2020-07-23 LAB
CYTOLOGIST CVX/VAG CYTO: NORMAL
CYTOLOGY CVX/VAG DOC CYTO: NORMAL
CYTOLOGY CVX/VAG DOC THIN PREP: NORMAL
DX ICD CODE: NORMAL
HIV 1 & 2 AB SER-IMP: NORMAL
HPV I/H RISK 1 DNA CVX QL PROBE+SIG AMP: NEGATIVE
OTHER STN SPEC: NORMAL
STAT OF ADQ CVX/VAG CYTO-IMP: NORMAL

## (undated) DEVICE — 3M(TM) TEGADERM(TM) TRANSPARENT FILM DRESSING FRAME STYLE 1627: Brand: 3M™ TEGADERM™

## (undated) DEVICE — SUT MNCRYL 3/0 CT1 36 IN Y944H

## (undated) DEVICE — 3M™ STERI-STRIP™ REINFORCED ADHESIVE SKIN CLOSURES, R1547, 1/2 IN X 4 IN (12 MM X 100 MM), 6 STRIPS/ENVELOPE: Brand: 3M™ STERI-STRIP™

## (undated) DEVICE — GLV SURG BIOGEL LTX PF 7 1/2

## (undated) DEVICE — ANTIBACTERIAL UNDYED BRAIDED (POLYGLACTIN 910), SYNTHETIC ABSORBABLE SUTURE: Brand: COATED VICRYL

## (undated) DEVICE — SOL IRR H2O BTL 1000ML STRL

## (undated) DEVICE — 3M™ STERI-STRIP™ COMPOUND BENZOIN TINCTURE 40 BAGS/CARTON 4 CARTONS/CASE C1544: Brand: 3M™ STERI-STRIP™

## (undated) DEVICE — SUT MNCRYL 0/0 CTX 36IN Y398H

## (undated) DEVICE — KENDALL SCD EXPRESS SLEEVES, KNEE LENGTH, MEDIUM: Brand: KENDALL SCD